# Patient Record
Sex: FEMALE | Race: WHITE | Employment: OTHER | URBAN - NONMETROPOLITAN AREA
[De-identification: names, ages, dates, MRNs, and addresses within clinical notes are randomized per-mention and may not be internally consistent; named-entity substitution may affect disease eponyms.]

---

## 2019-05-22 RX ORDER — BUPIVACAINE HYDROCHLORIDE 2.5 MG/ML
5 INJECTION, SOLUTION EPIDURAL; INFILTRATION; INTRACAUDAL ONCE
Status: CANCELLED | OUTPATIENT
Start: 2019-05-22 | End: 2019-05-22

## 2019-05-22 RX ORDER — METHYLPREDNISOLONE ACETATE 80 MG/ML
80 INJECTION, SUSPENSION INTRA-ARTICULAR; INTRALESIONAL; INTRAMUSCULAR; SOFT TISSUE ONCE
Status: CANCELLED | OUTPATIENT
Start: 2019-05-22 | End: 2019-05-22

## 2019-05-23 ENCOUNTER — HOSPITAL ENCOUNTER (OUTPATIENT)
Dept: INTERVENTIONAL RADIOLOGY/VASCULAR | Age: 69
Discharge: HOME | End: 2019-05-23
Payer: MEDICARE

## 2019-05-23 VITALS
DIASTOLIC BLOOD PRESSURE: 64 MMHG | SYSTOLIC BLOOD PRESSURE: 139 MMHG | HEART RATE: 77 BPM | TEMPERATURE: 98.4 F | RESPIRATION RATE: 16 BRPM

## 2019-05-23 PROCEDURE — 6360000002 HC RX W HCPCS

## 2019-05-23 PROCEDURE — 2500000003 HC RX 250 WO HCPCS

## 2019-05-23 PROCEDURE — 62323 NJX INTERLAMINAR LMBR/SAC: CPT

## 2019-05-23 PROCEDURE — 2709999900 HC NON-CHARGEABLE SUPPLY

## 2019-05-23 PROCEDURE — 6360000004 HC RX CONTRAST MEDICATION: Performed by: RADIOLOGY

## 2019-05-23 RX ORDER — BENZTROPINE MESYLATE 2 MG/1
2 TABLET ORAL 2 TIMES DAILY
COMMUNITY

## 2019-05-23 RX ORDER — BUPIVACAINE HYDROCHLORIDE 2.5 MG/ML
5 INJECTION, SOLUTION EPIDURAL; INFILTRATION; INTRACAUDAL ONCE
Status: COMPLETED | OUTPATIENT
Start: 2019-05-23 | End: 2019-05-23

## 2019-05-23 RX ORDER — RAMIPRIL 5 MG/1
5 CAPSULE ORAL DAILY
COMMUNITY

## 2019-05-23 RX ORDER — METHYLPREDNISOLONE ACETATE 80 MG/ML
80 INJECTION, SUSPENSION INTRA-ARTICULAR; INTRALESIONAL; INTRAMUSCULAR; SOFT TISSUE ONCE
Status: COMPLETED | OUTPATIENT
Start: 2019-05-23 | End: 2019-05-23

## 2019-05-23 RX ORDER — GABAPENTIN 300 MG/1
300 CAPSULE ORAL 3 TIMES DAILY
COMMUNITY

## 2019-05-23 RX ADMIN — IOHEXOL 1 ML: 180 INJECTION INTRAVENOUS at 11:53

## 2019-05-23 RX ADMIN — BUPIVACAINE HYDROCHLORIDE 2 ML: 2.5 INJECTION, SOLUTION EPIDURAL; INFILTRATION; INTRACAUDAL at 11:53

## 2019-05-23 RX ADMIN — METHYLPREDNISOLONE ACETATE 80 MG: 80 INJECTION, SUSPENSION INTRA-ARTICULAR; INTRALESIONAL; INTRAMUSCULAR; SOFT TISSUE at 11:53

## 2019-05-23 ASSESSMENT — PAIN SCALES - GENERAL
PAINLEVEL_OUTOF10: 8
PAINLEVEL_OUTOF10: 0
PAINLEVEL_OUTOF10: 0

## 2019-05-23 ASSESSMENT — PAIN DESCRIPTION - DESCRIPTORS: DESCRIPTORS: STABBING

## 2019-05-23 ASSESSMENT — PAIN - FUNCTIONAL ASSESSMENT: PAIN_FUNCTIONAL_ASSESSMENT: 0-10

## 2019-05-23 NOTE — PROGRESS NOTES
[facility] 327 INJECTION SITE SOFT AND DRY, DENIES PAIN OR NUMBNESS. LEG MOVEMENT GOOD. WATER TAKEN. 1230 INJECTION SITE SOFT AND DRY, DENIES PAIN OR NUMBNESS. UP WITH HELP. HOME INSTRUCTIONS TO PT AND FAMILY VERBALIZED UNDERSTANDING. [address] WITH FAMILY.

## 2019-05-23 NOTE — PROGRESS NOTES
[address] BLOCK PROCEDURE REVIEWED WITH PT VERBALIZED UNDERSTANDING. PEDAL PUSH AND PULL WEAKER ON RIGHT.

## 2019-05-23 NOTE — BRIEF OP NOTE
Department of Radiology  Post Procedure Progress Note    Pre-Procedure Diagnosis:  Lumbar radiculopathy     Procedure Performed:  Epidural block/steroid injection      Anesthesia: local     Findings: successful    Immediate Complications:  None    Estimated Blood Loss: minimal    SEE DICTATED PROCEDURE NOTE FOR COMPLETE DETAILS.       Electronically signed by Jerod Ulrich MD on 5/23/2019 at 12:00 PM

## 2019-05-23 NOTE — H&P
Formulation and discussion of sedation / procedure plans, risks, benefits, side effects and alternatives with patient and/or responsible adult completed.     Electronically signed by Jace Bamberger, MD on 5/23/2019 at 11:57 AM

## 2023-10-20 ENCOUNTER — HOSPITAL ENCOUNTER (OUTPATIENT)
Age: 73
Discharge: HOME | End: 2023-10-20
Payer: MEDICARE

## 2023-10-20 LAB
ANION GAP SERPL CALC-SCNC: 12 MEQ/L (ref 8–16)
BUN SERPL-MCNC: 17 MG/DL (ref 7–22)
CALCIUM SERPL-MCNC: 9.5 MG/DL (ref 8.5–10.5)
CHLORIDE SERPL-SCNC: 104 MEQ/L (ref 98–111)
CO2 SERPL-SCNC: 26 MEQ/L (ref 23–33)
CREAT SERPL-MCNC: 0.8 MG/DL (ref 0.4–1.2)
DEPRECATED RDW RBC AUTO: 44.9 FL (ref 35–45)
ERYTHROCYTE [DISTWIDTH] IN BLOOD BY AUTOMATED COUNT: 13.2 % (ref 11.5–14.5)
GFR SERPL CREATININE-BSD FRML MDRD: > 60 ML/MIN/1.73M2
GLUCOSE SERPL-MCNC: 101 MG/DL (ref 70–108)
HCT VFR BLD AUTO: 45.5 % (ref 37–47)
HGB BLD-MCNC: 14.3 GM/DL (ref 12–16)
MCH RBC QN AUTO: 29.1 PG (ref 26–33)
MCHC RBC AUTO-ENTMCNC: 31.4 GM/DL (ref 32.2–35.5)
MCV RBC AUTO: 92.7 FL (ref 81–99)
PLATELET # BLD AUTO: 216 THOU/MM3 (ref 130–400)
PMV BLD AUTO: 11.1 FL (ref 9.4–12.4)
POTASSIUM SERPL-SCNC: 4.1 MEQ/L (ref 3.5–5.2)
RBC # BLD AUTO: 4.91 MILL/MM3 (ref 4.2–5.4)
SODIUM SERPL-SCNC: 142 MEQ/L (ref 135–145)
WBC # BLD AUTO: 4.9 THOU/MM3 (ref 4.8–10.8)

## 2023-10-20 PROCEDURE — 36415 COLL VENOUS BLD VENIPUNCTURE: CPT

## 2023-10-20 PROCEDURE — 93005 ELECTROCARDIOGRAM TRACING: CPT | Performed by: SPECIALIST

## 2023-10-20 PROCEDURE — 85027 COMPLETE CBC AUTOMATED: CPT

## 2023-10-20 PROCEDURE — 80048 BASIC METABOLIC PNL TOTAL CA: CPT

## 2023-10-21 LAB
EKG ATRIAL RATE: 74 BPM
EKG P AXIS: 77 DEGREES
EKG P-R INTERVAL: 180 MS
EKG Q-T INTERVAL: 352 MS
EKG QRS DURATION: 76 MS
EKG QTC CALCULATION (BAZETT): 390 MS
EKG R AXIS: 61 DEGREES
EKG T AXIS: 61 DEGREES
EKG VENTRICULAR RATE: 74 BPM

## 2023-11-10 RX ORDER — RAMIPRIL 10 MG/1
10 CAPSULE ORAL DAILY
COMMUNITY

## 2023-11-10 NOTE — PROGRESS NOTES
Instructions given to patient, she was struggling to understand.   NPO after midnight  [employer] and drivers license  Wear comfortable clean clothing  Do not bring jewelry  Shower night before and morning of surgery with a liquid antibacterial soap  Bring list of medications with dosage and how often taken  Follow all instructions given by your physician   needed at discharge  Please limit to 2 visitors for surgery  You must have a responsible adult with you day of surgery and for 24 hours after surgery  Call [phone] for any questions

## 2023-11-10 NOTE — PROGRESS NOTES
Called patient's son Esperanza Edu and surgery instructions given to him  NPO after midnight  [employer] and drivers license  Wear comfortable clean clothing  Do not bring jewelry  Shower night before and morning of surgery with a liquid antibacterial soap  Bring list of medications with dosage and how often taken  Follow all instructions given by your physician   needed at discharge  Please limit to 2 visitors for surgery  You must have a responsible adult with you day of surgery and for 24 hours after surgery  Call [phone] for any questions

## 2023-11-17 ENCOUNTER — ANESTHESIA EVENT (OUTPATIENT)
Dept: OPERATING ROOM | Age: 73
End: 2023-11-17
Payer: MEDICARE

## 2023-11-17 ENCOUNTER — ANESTHESIA (OUTPATIENT)
Dept: OPERATING ROOM | Age: 73
End: 2023-11-17
Payer: MEDICARE

## 2023-11-17 ENCOUNTER — HOSPITAL ENCOUNTER (OUTPATIENT)
Age: 73
Setting detail: OUTPATIENT SURGERY
Discharge: HOME | End: 2023-11-17
Attending: SPECIALIST | Admitting: SPECIALIST
Payer: MEDICARE

## 2023-11-17 VITALS
SYSTOLIC BLOOD PRESSURE: 194 MMHG | BODY MASS INDEX: 30.33 KG/M2 | HEIGHT: 62 IN | TEMPERATURE: 98.3 F | OXYGEN SATURATION: 95 % | RESPIRATION RATE: 16 BRPM | HEART RATE: 81 BPM | WEIGHT: 164.8 LBS | DIASTOLIC BLOOD PRESSURE: 74 MMHG

## 2023-11-17 DIAGNOSIS — C44.311 BASAL CELL CARCINOMA OF NOSE: Primary | ICD-10-CM

## 2023-11-17 PROCEDURE — 2500000003 HC RX 250 WO HCPCS: Performed by: NURSE ANESTHETIST, CERTIFIED REGISTERED

## 2023-11-17 PROCEDURE — 6370000000 HC RX 637 (ALT 250 FOR IP): Performed by: SPECIALIST

## 2023-11-17 PROCEDURE — 2500000003 HC RX 250 WO HCPCS: Performed by: SPECIALIST

## 2023-11-17 PROCEDURE — 6360000002 HC RX W HCPCS: Performed by: SPECIALIST

## 2023-11-17 PROCEDURE — 3600000012 HC SURGERY LEVEL 2 ADDTL 15MIN: Performed by: SPECIALIST

## 2023-11-17 PROCEDURE — 3700000001 HC ADD 15 MINUTES (ANESTHESIA): Performed by: SPECIALIST

## 2023-11-17 PROCEDURE — 3700000000 HC ANESTHESIA ATTENDED CARE: Performed by: SPECIALIST

## 2023-11-17 PROCEDURE — 3600000002 HC SURGERY LEVEL 2 BASE: Performed by: SPECIALIST

## 2023-11-17 PROCEDURE — 2580000003 HC RX 258: Performed by: NURSE ANESTHETIST, CERTIFIED REGISTERED

## 2023-11-17 PROCEDURE — 7100000011 HC PHASE II RECOVERY - ADDTL 15 MIN: Performed by: SPECIALIST

## 2023-11-17 PROCEDURE — 2709999900 HC NON-CHARGEABLE SUPPLY: Performed by: SPECIALIST

## 2023-11-17 PROCEDURE — 7100000010 HC PHASE II RECOVERY - FIRST 15 MIN: Performed by: SPECIALIST

## 2023-11-17 PROCEDURE — 6360000002 HC RX W HCPCS: Performed by: NURSE ANESTHETIST, CERTIFIED REGISTERED

## 2023-11-17 RX ORDER — PROPOFOL 10 MG/ML
INJECTION, EMULSION INTRAVENOUS PRN
Status: DISCONTINUED | OUTPATIENT
Start: 2023-11-17 | End: 2023-11-17 | Stop reason: SDUPTHER

## 2023-11-17 RX ORDER — FENTANYL CITRATE 50 UG/ML
INJECTION, SOLUTION INTRAMUSCULAR; INTRAVENOUS PRN
Status: DISCONTINUED | OUTPATIENT
Start: 2023-11-17 | End: 2023-11-17 | Stop reason: SDUPTHER

## 2023-11-17 RX ORDER — LIDOCAINE HYDROCHLORIDE AND EPINEPHRINE 10; 10 MG/ML; UG/ML
INJECTION, SOLUTION INFILTRATION; PERINEURAL PRN
Status: DISCONTINUED | OUTPATIENT
Start: 2023-11-17 | End: 2023-11-17 | Stop reason: ALTCHOICE

## 2023-11-17 RX ORDER — ERYTHROMYCIN 5 MG/G
OINTMENT OPHTHALMIC PRN
Status: DISCONTINUED | OUTPATIENT
Start: 2023-11-17 | End: 2023-11-17 | Stop reason: ALTCHOICE

## 2023-11-17 RX ORDER — HYDROCODONE BITARTRATE AND ACETAMINOPHEN 5; 325 MG/1; MG/1
1 TABLET ORAL EVERY 6 HOURS PRN
Qty: 10 TABLET | Refills: 0 | Status: SHIPPED | OUTPATIENT
Start: 2023-11-17 | End: 2023-11-20

## 2023-11-17 RX ORDER — LIDOCAINE HYDROCHLORIDE 20 MG/ML
INJECTION, SOLUTION EPIDURAL; INFILTRATION; INTRACAUDAL; PERINEURAL PRN
Status: DISCONTINUED | OUTPATIENT
Start: 2023-11-17 | End: 2023-11-17 | Stop reason: SDUPTHER

## 2023-11-17 RX ORDER — SODIUM CHLORIDE 9 MG/ML
INJECTION, SOLUTION INTRAVENOUS CONTINUOUS PRN
Status: DISCONTINUED | OUTPATIENT
Start: 2023-11-17 | End: 2023-11-17 | Stop reason: SDUPTHER

## 2023-11-17 RX ADMIN — FENTANYL CITRATE 25 MCG: 50 INJECTION, SOLUTION INTRAMUSCULAR; INTRAVENOUS at 12:16

## 2023-11-17 RX ADMIN — FENTANYL CITRATE 50 MCG: 50 INJECTION, SOLUTION INTRAMUSCULAR; INTRAVENOUS at 12:00

## 2023-11-17 RX ADMIN — SODIUM CHLORIDE: 9 INJECTION, SOLUTION INTRAVENOUS at 11:55

## 2023-11-17 RX ADMIN — LIDOCAINE HYDROCHLORIDE 50 MG: 20 INJECTION, SOLUTION EPIDURAL; INFILTRATION; INTRACAUDAL; PERINEURAL at 12:00

## 2023-11-17 RX ADMIN — PROPOFOL 25 MG: 10 INJECTION, EMULSION INTRAVENOUS at 12:10

## 2023-11-17 RX ADMIN — PROPOFOL 50 MG: 10 INJECTION, EMULSION INTRAVENOUS at 12:00

## 2023-11-17 RX ADMIN — PROPOFOL 25 MG: 10 INJECTION, EMULSION INTRAVENOUS at 12:16

## 2023-11-17 RX ADMIN — FENTANYL CITRATE 25 MCG: 50 INJECTION, SOLUTION INTRAMUSCULAR; INTRAVENOUS at 13:00

## 2023-11-17 RX ADMIN — Medication 2000 MG: at 12:10

## 2023-11-17 ASSESSMENT — PAIN - FUNCTIONAL ASSESSMENT: PAIN_FUNCTIONAL_ASSESSMENT: NONE - DENIES PAIN

## 2023-11-17 ASSESSMENT — PAIN SCALES - GENERAL: PAINLEVEL_OUTOF10: 0

## 2023-11-17 NOTE — DISCHARGE INSTRUCTIONS
POST OPERATIVE INSTRUCTION SHEET  SKIN TUMOR/LESION REMOVAL    Activity:  No strenuous activity for 48 hours  No activity that stresses the suture closure/incision  Regular diet  ABSOLUTELY NO NICOTINE OF ANY TYPE    Wound Care:  Leave yellow dressing on nose in place. Do not remove yellow dressing. Do not get wet. Dermabond was used in front of your ear, do not scrub, rub or pick at adhesive glue  Recommend sleeping in a recliner or with head elevated for next 2-3 nights. Limitations:  No swimming, hot tub, sauna or soaking in a bathtub    Prescriptions: Take exactly as prescribed  A prescription for Riley Contreras has been sent to your pharmacy  You may resume Aleve on Sunday      Follow-Up:  Wednesday November 22nd at 8:30AM. Bring Erythromycin ointment with you to follow up. You will not need to use it until then. Notify our office if you experience any of the following:   Develop a fever (temperature is greater than 100.5F)   Develop redness greater than 1 cm around incision or red streaks up         extremity   Have any excess bleeding/ increased drainage or swelling at the             incision site        How Do you Prevent Surgical Site Infections? *HAND WASHING     *STOP SHAVING   Wash your hands multiple times daily  Do not shave incisional area 48 hours before   with soap for 20 seconds. or until 2 weeks after surgery. This can   Before eating and wound care. cause tiny cuts in the skin which can lead to infection. After using the bathroom or touching pets. *BALANCE      Times of activity and rest.      Stay away from people who may be sick. *QUIT SMOKING      Cigarette smoking leads to slow wound      healing. [facility] YOUR BODY      Follow your doctor's instructions on washing the night before and the day of your surgery. You may use products like:  Dial antibacterial soap, Hibiclens, Brittany-hex.          Do not share personal items such as towels, wash cloths, or toothbrush. *BLOOD SUGAR CONTROL                  Lower blood sugars help to prevent                          infections, scarring, and slow wound                          healing. Blood sugar goal less than 200. *EAT HEALTHY       Eat plenty of protein,        vegetables, and fruits. Limit sugars and processed foods. *BED LINENS      *DRESSING CHANGE        Make sure your bed linens are freshly   Follow your discharge instructions for wound        washed. NO pets in the bed. Your animals care and bathing:       carry bacteria in their fur, skin or feathers  ~Keep your dressing clean and dry       which can enter site, causing infection. ~Clean and washed clothes are important                                                            ~Call the doctor if you develop a fever,                                                       your incision has redness, an odor or                                                             yellowish/greenish drainage.

## 2023-11-17 NOTE — ANESTHESIA POSTPROCEDURE EVALUATION
Department of Anesthesiology  Postprocedure Note    Patient: Mayra Stevens  MRN: [id number]  YOB: 1950  Date of evaluation: 11/17/2023      Procedure Summary     Date: 11/17/23 Room / Location: [address] [place] [address] 04 / [facility]    Anesthesia Start: 9440 Anesthesia Stop: 0090    Procedure: MOHS Defect BCC Bridge of Nose with FTSG from left preauricular (Face) Diagnosis:       Basal cell carcinoma of nose      (Basal cell carcinoma of nose [C44.311])    Surgeons: Azul Delarosa MD Responsible Provider: Ludmila Matt MD    Anesthesia Type: MAC ASA Status: 3          Anesthesia Type: MAC    Tony Phase I:      Tony Phase II: Tony Score: 10      Anesthesia Post Evaluation    Complications: no

## 2023-11-17 NOTE — PROGRESS NOTES
1308 Patient to phase 2 from surgery. Report Eliana Garsia RN. Patient alert, appropriately answers questions. Patient's arms shaking, son and  at bedside states this is normal from [address] [place] [address]. Vitals stable, BP noted high-patients arm is shaking and took multiple attempts. Patient's  states she takes BP meds and home and they can check her BP at home. Bolster and steri strips on nose clean, dry, intact. Skin graft site in front of right ear with no drainage noted. 1314 Call light in reach. Snack and drink given per preference. 1330 IV taken out and dressing applied with no complications. Patient assisted in getting dressed at this time. 5 AVS discussed with patient, , and son. All questions answered at this time. Erithromycin ointment given to . 1355 Patient taken to discharge doors in stable condition. Discharged with AVS and all belongings. Bolster and incision along left ear clean, dry, intact.

## 2023-11-17 NOTE — OP NOTE
Operative Note    Patient name: Ike Mendes  Record Number: [id number]    Primary Care Physician: Isha Olivo MD     1950    Date of Procedure: 2023    Pre-operative Diagnosis: 5cm2 defect of bridge of nose s/p MOHS for basal cell carcinoma    Post-operative Diagnosis: Same    Procedure Performed: Full thickness skin graft (5 cm2) repair of bridge of nose (CPT 34286)    Surgeons/Assistants: MD Michael Ratliff PA-C    Estimated Blood Loss: 5ml     Complications: none immediately appreciated    Procedure: With the patient lying in the supine position and under adequate anesthesia per the anesthesia team.   Local anesthesia consisting of 11 ml of 1% Lidocaine 1:100,000 with epinephrine solution of the donor site of the left preauricular sulcus as well as the nasal bridge defect. The areas were prepped and draped in the standard surgical fashion. The patient has very thin skin and a large (5cm2)  defect which could not be closed primarily due to tension and distortion of eyelids, therefore a full thickness skin graft was taken. It was defatted and secured in position with a 3-0 silk suture tie and then a 5-0 fast absorbable suture was placed in a simple running fashion. A Bacitracin Xeroform and moist cotton ball tie over bolster was secured using the tails of the silk sutures. The donor site was closed with a 4-0 Monocryl suture placed in interrupted buried fashion and then Histoacryl respectively. The patient tolerated the procedure well and remained hemodynamically stable throughout the procedure and was quite comfortable throughout the operative course. Clinical staging for cancer cases:  Ct  Cn  Jitendra De La Torre MD  Electronically signed by me on 2023 at 1:18 PM Operative Note      Patient: Jose Roberto Burden  YOB: 1950  MRN: [id number]    Date of Procedure: 2023    Pre-Op Diagnosis Codes:      * Basal cell carcinoma of nose [C44.311]    Post-Op Diagnosis: Same       Procedure(s):  MOHS Defect BCC Bridge of Nose with FTSG from left preauricular    Surgeon(s):  Cachorro Whatley MD    Assistant:   Physician Assistant: Dawit Martinez PA-C    Anesthesia: Monitor Anesthesia Care    Estimated Blood Loss (mL): less than 50     Complications: None    Specimens:   * No specimens in log *    Implants:  * No implants in log *      Drains: * No LDAs found *    Findings: 5cm2 defect of bridge of nose s/p MOHS for basal cell carcinoma          Detailed Description of Procedure:   Full thickness skin graft (5 cm2) repair of bridge of nose (CPT 28578)      Electronically signed by Cachorro Whatley MD on 11/17/2023 at 1:17 PM

## 2023-11-17 NOTE — ANESTHESIA PRE PROCEDURE
Endo/Other:                     Abdominal:             Vascular:           Other Findings:           Anesthesia Plan      MAC     ASA 3                                   Marilu Spicer MD   11/17/2023

## 2023-11-17 NOTE — H&P
[address]  History and Physical Update    Pt Name: Yamil Baptiste  MRN: [id number]  YOB: 1950  Date of evaluation: 11/17/2023    I have examined the patient and reviewed the H&P/Consult and there are no changes to the patient or plans.       Radha Medeiros MD  Electronically signed 11/17/2023 at 11:20 AM

## 2023-12-12 ENCOUNTER — NURSE ONLY (OUTPATIENT)
Dept: LAB | Age: 73
End: 2023-12-12

## 2023-12-12 DIAGNOSIS — R49.8 HYPOPHONIA: ICD-10-CM

## 2023-12-12 DIAGNOSIS — R26.89 BALANCE PROBLEM: ICD-10-CM

## 2023-12-12 DIAGNOSIS — G20.A2 PARKINSON'S DISEASE WITH FLUCTUATING MANIFESTATIONS, UNSPECIFIED WHETHER DYSKINESIA PRESENT: ICD-10-CM

## 2023-12-12 DIAGNOSIS — M89.9 DISORDER OF BONE, UNSPECIFIED: ICD-10-CM

## 2023-12-12 DIAGNOSIS — R29.6 FREQUENT FALLS: ICD-10-CM

## 2023-12-12 DIAGNOSIS — R25.8 BRADYKINESIA: ICD-10-CM

## 2023-12-12 LAB
25(OH)D3 SERPL-MCNC: 69 NG/ML (ref 30–100)
FOLATE SERPL-MCNC: 10 NG/ML (ref 4.8–24.2)
VIT B12 SERPL-MCNC: 776 PG/ML (ref 211–911)

## 2023-12-15 LAB — PYRIDOXAL PHOS SERPL-SCNC: 11.5 NMOL/L (ref 20–125)

## 2024-01-25 ENCOUNTER — OFFICE VISIT (OUTPATIENT)
Dept: NEUROLOGY | Age: 74
End: 2024-01-25
Payer: MEDICARE

## 2024-01-25 VITALS
DIASTOLIC BLOOD PRESSURE: 78 MMHG | OXYGEN SATURATION: 98 % | SYSTOLIC BLOOD PRESSURE: 128 MMHG | HEART RATE: 78 BPM | BODY MASS INDEX: 30.36 KG/M2 | WEIGHT: 165 LBS | HEIGHT: 62 IN

## 2024-01-25 DIAGNOSIS — G20.A2 PARKINSON'S DISEASE WITH FLUCTUATING MANIFESTATIONS, UNSPECIFIED WHETHER DYSKINESIA PRESENT: Primary | ICD-10-CM

## 2024-01-25 DIAGNOSIS — R49.8 HYPOPHONIA: ICD-10-CM

## 2024-01-25 DIAGNOSIS — R25.8 BRADYKINESIA: ICD-10-CM

## 2024-01-25 DIAGNOSIS — R26.89 BALANCE PROBLEM: ICD-10-CM

## 2024-01-25 DIAGNOSIS — R29.6 FREQUENT FALLS: ICD-10-CM

## 2024-01-25 PROCEDURE — 1036F TOBACCO NON-USER: CPT | Performed by: NURSE PRACTITIONER

## 2024-01-25 PROCEDURE — G8427 DOCREV CUR MEDS BY ELIG CLIN: HCPCS | Performed by: NURSE PRACTITIONER

## 2024-01-25 PROCEDURE — G8484 FLU IMMUNIZE NO ADMIN: HCPCS | Performed by: NURSE PRACTITIONER

## 2024-01-25 PROCEDURE — G8400 PT W/DXA NO RESULTS DOC: HCPCS | Performed by: NURSE PRACTITIONER

## 2024-01-25 PROCEDURE — 1090F PRES/ABSN URINE INCON ASSESS: CPT | Performed by: NURSE PRACTITIONER

## 2024-01-25 PROCEDURE — 1123F ACP DISCUSS/DSCN MKR DOCD: CPT | Performed by: NURSE PRACTITIONER

## 2024-01-25 PROCEDURE — 3017F COLORECTAL CA SCREEN DOC REV: CPT | Performed by: NURSE PRACTITIONER

## 2024-01-25 PROCEDURE — G8417 CALC BMI ABV UP PARAM F/U: HCPCS | Performed by: NURSE PRACTITIONER

## 2024-01-25 PROCEDURE — 99214 OFFICE O/P EST MOD 30 MIN: CPT | Performed by: NURSE PRACTITIONER

## 2024-01-25 RX ORDER — AMANTADINE HYDROCHLORIDE 100 MG/1
100 CAPSULE, GELATIN COATED ORAL 2 TIMES DAILY
Qty: 60 CAPSULE | Refills: 4 | Status: SHIPPED | OUTPATIENT
Start: 2024-01-25

## 2024-01-25 NOTE — PATIENT INSTRUCTIONS
Change Sinemet 25/100 1.5 tablets three times a day,  take every 5 hours while awake, take at 7am, 12pm, and 5 pm daily   Change Amantadine to 100 mg two times daily at 8am and 3pm  Continue with Cogentin at current dose  Follow through with  physical therapy recommendations: parkinson's disease, gait safety  You need to stay physically active  Call with any new symptoms or concerns.   Follow up in 6 weeks.

## 2024-01-25 NOTE — PROGRESS NOTES
NEUROLOGY OUT PATIENT FOLLOW UP NOTE:  1/25/20249:01 AM    Angi Mejia is here for follow up for parkinson's disease, hypophonia, bradykinesia.            No Known Allergies    Current Outpatient Medications:     clotrimazole (LOTRIMIN) 1 % cream, Apply topically 2 times daily Apply topically 2 times daily., Disp: , Rfl:     donepezil (ARICEPT) 5 MG tablet, Take 1 tablet by mouth nightly, Disp: , Rfl:     carbidopa-levodopa (SINEMET)  MG per tablet, Take 1 tablet by mouth 3 times daily Take every 5 hours while awake. Take at 7am, 12pm, and 5pm daily, Disp: 90 tablet, Rfl: 3    amantadine (SYMMETREL) 100 MG capsule, Take 1 capsule by mouth daily Take at 8am, Disp: 30 capsule, Rfl: 3    ramipril (ALTACE) 10 MG capsule, Take 1 capsule by mouth daily, Disp: , Rfl:     Naproxen Sodium (ALEVE PO), Take by mouth daily as needed, Disp: , Rfl:     Cyanocobalamin (VITAMIN B-12 PO), Take by mouth daily, Disp: , Rfl:     Cholecalciferol (D3 VITAMIN PO), Take by mouth daily, Disp: , Rfl:     benztropine (COGENTIN) 2 MG tablet, Take 1 tablet by mouth daily, Disp: , Rfl:     I reviewed the past medical history, allergies, medications, social history and family history.       PE:   Vitals:    01/25/24 0856   BP: 128/78   Site: Left Upper Arm   Position: Sitting   Cuff Size: Medium Adult   Pulse: 78   SpO2: 98%   Weight: 74.8 kg (165 lb)   Height: 1.575 m (5' 2\")     General Appearance:  alert and cooperative  Gen: NAD, Language is Intact. Skin: no rash, lesion, dry  to touch. warm  Head: no rash, no icterus  Neck: There is no carotid bruits. The Neck is supple.    Neuro: CN 2-12 grossly intact with no focal deficits. Power 5/5 Throughout symmetric, Reflexes are  symmetric. Long tracts are intact. Cerebellar exam is Intact. Sensory exam is intact to light touch.  Gait and station shuffling, guarded +ve right hand resting tremor, +ve pinrolling +ve bradykinesia, +ve Hypohonia, +ve decreased blink rate, +ve difficulty

## 2024-03-08 ENCOUNTER — OFFICE VISIT (OUTPATIENT)
Dept: NEUROLOGY | Age: 74
End: 2024-03-08

## 2024-03-08 VITALS
HEIGHT: 62 IN | DIASTOLIC BLOOD PRESSURE: 78 MMHG | HEART RATE: 69 BPM | WEIGHT: 160 LBS | OXYGEN SATURATION: 98 % | SYSTOLIC BLOOD PRESSURE: 130 MMHG | BODY MASS INDEX: 29.44 KG/M2

## 2024-03-08 DIAGNOSIS — R25.8 BRADYKINESIA: ICD-10-CM

## 2024-03-08 DIAGNOSIS — R26.89 BALANCE PROBLEM: ICD-10-CM

## 2024-03-08 DIAGNOSIS — G20.A2 PARKINSON'S DISEASE WITHOUT DYSKINESIA, WITH FLUCTUATING MANIFESTATIONS: Primary | ICD-10-CM

## 2024-03-08 DIAGNOSIS — R49.8 HYPOPHONIA: ICD-10-CM

## 2024-03-08 NOTE — PATIENT INSTRUCTIONS
Take Sinemet 25/100 1.5 tablets three times a day,  take every 5 hours while awake, take at 7am, 12pm, and 5 pm daily   Amantadine 100 mg two times daily at 8am and 3pm  Continue with Cogentin at current dose  Continue with Vitamin B6 supplements, 50 mg daily over the counter  Follow through with  physical therapy recommendations: parkinson's disease, gait safety  Use your cane at all times  You need to stay physically active  Call with any new symptoms or concerns.   Follow up in 3 months.

## 2024-03-08 NOTE — PROGRESS NOTES
NEUROLOGY OUT PATIENT FOLLOW UP NOTE:  3/8/60189:42 AM    Angi Mejia is here for follow up for parkinson's disease, hypophonia, bradykinesia.            No Known Allergies    Current Outpatient Medications:     carbidopa-levodopa (SINEMET)  MG per tablet, Take 1.5 tablets by mouth 3 times daily, Disp: 135 tablet, Rfl: 3    amantadine (SYMMETREL) 100 MG capsule, Take 1 capsule by mouth 2 times daily, Disp: 60 capsule, Rfl: 4    clotrimazole (LOTRIMIN) 1 % cream, Apply topically 2 times daily Apply topically 2 times daily., Disp: , Rfl:     donepezil (ARICEPT) 5 MG tablet, Take 1 tablet by mouth nightly, Disp: , Rfl:     ramipril (ALTACE) 10 MG capsule, Take 1 capsule by mouth daily, Disp: , Rfl:     Naproxen Sodium (ALEVE PO), Take by mouth daily as needed, Disp: , Rfl:     Cyanocobalamin (VITAMIN B-12 PO), Take by mouth daily, Disp: , Rfl:     Cholecalciferol (D3 VITAMIN PO), Take by mouth daily, Disp: , Rfl:     benztropine (COGENTIN) 2 MG tablet, Take 1 tablet by mouth daily, Disp: , Rfl:     I reviewed the past medical history, allergies, medications, social history and family history.       PE:   Vitals:    03/08/24 0835   BP: 130/78   Site: Left Upper Arm   Position: Sitting   Cuff Size: Medium Adult   Pulse: 69   SpO2: 98%   Weight: 72.6 kg (160 lb)   Height: 1.575 m (5' 2\")     General Appearance:  alert and cooperative  Gen: NAD, Language is Intact. Skin: no rash, lesion, dry  to touch. warm  Head: no rash, no icterus  Neck: There is no carotid bruits. The Neck is supple.    Neuro: CN 2-12 grossly intact with no focal deficits. Power 5/5 Throughout symmetric, Reflexes are  symmetric. Long tracts are intact. Cerebellar exam is Intact. Sensory exam is intact to light touch.  Gait and station shuffling, guarded +ve right hand resting tremor, +ve pinrolling +ve bradykinesia, +ve Hypohonia, +ve decreased blink rate, +ve difficulty exiting chair, +ve right arm decreased arm swing, +vestooped

## 2024-04-22 ENCOUNTER — APPOINTMENT (OUTPATIENT)
Dept: MRI IMAGING | Age: 74
DRG: 689 | End: 2024-04-22
Payer: MEDICARE

## 2024-04-22 ENCOUNTER — APPOINTMENT (OUTPATIENT)
Dept: CT IMAGING | Age: 74
DRG: 689 | End: 2024-04-22
Payer: MEDICARE

## 2024-04-22 ENCOUNTER — APPOINTMENT (OUTPATIENT)
Dept: GENERAL RADIOLOGY | Age: 74
DRG: 689 | End: 2024-04-22
Payer: MEDICARE

## 2024-04-22 ENCOUNTER — HOSPITAL ENCOUNTER (INPATIENT)
Age: 74
LOS: 3 days | Discharge: NURSING, REHAB OR LONG-TERM CARE | DRG: 689 | End: 2024-04-25
Attending: EMERGENCY MEDICINE | Admitting: STUDENT IN AN ORGANIZED HEALTH CARE EDUCATION/TRAINING PROGRAM
Payer: MEDICARE

## 2024-04-22 ENCOUNTER — APPOINTMENT (OUTPATIENT)
Dept: ULTRASOUND IMAGING | Age: 74
DRG: 689 | End: 2024-04-22
Payer: MEDICARE

## 2024-04-22 DIAGNOSIS — R29.90 STROKE-LIKE SYMPTOM: Primary | ICD-10-CM

## 2024-04-22 DIAGNOSIS — Z51.5 PALLIATIVE CARE PATIENT: ICD-10-CM

## 2024-04-22 DIAGNOSIS — G20.A1 PARKINSON'S DISEASE, UNSPECIFIED WHETHER DYSKINESIA PRESENT, UNSPECIFIED WHETHER MANIFESTATIONS FLUCTUATE (HCC): ICD-10-CM

## 2024-04-22 PROBLEM — G92.8 TOXIC METABOLIC ENCEPHALOPATHY: Status: ACTIVE | Noted: 2024-04-22

## 2024-04-22 LAB
ALBUMIN SERPL BCG-MCNC: 3.8 G/DL (ref 3.5–5.1)
ALP SERPL-CCNC: 241 U/L (ref 38–126)
ALT SERPL W/O P-5'-P-CCNC: < 5 U/L (ref 11–66)
ANION GAP SERPL CALC-SCNC: 12 MEQ/L (ref 8–16)
AST SERPL-CCNC: 15 U/L (ref 5–40)
BACTERIA URNS QL MICRO: ABNORMAL /HPF
BASOPHILS ABSOLUTE: 0.1 THOU/MM3 (ref 0–0.1)
BASOPHILS NFR BLD AUTO: 0.7 %
BILIRUB CONJ SERPL-MCNC: < 0.2 MG/DL (ref 0–0.3)
BILIRUB SERPL-MCNC: 0.5 MG/DL (ref 0.3–1.2)
BILIRUB UR QL STRIP.AUTO: NEGATIVE
BUN SERPL-MCNC: 14 MG/DL (ref 7–22)
CALCIUM SERPL-MCNC: 9.3 MG/DL (ref 8.5–10.5)
CASTS #/AREA URNS LPF: ABNORMAL /LPF
CASTS 2: ABNORMAL /LPF
CHARACTER UR: ABNORMAL
CHLORIDE SERPL-SCNC: 100 MEQ/L (ref 98–111)
CO2 SERPL-SCNC: 26 MEQ/L (ref 23–33)
COLOR: YELLOW
CREAT SERPL-MCNC: 0.6 MG/DL (ref 0.4–1.2)
CRYSTALS URNS MICRO: ABNORMAL
DEPRECATED MEAN GLUCOSE BLD GHB EST-ACNC: 102 MG/DL (ref 70–126)
DEPRECATED RDW RBC AUTO: 41.3 FL (ref 35–45)
EOSINOPHIL NFR BLD AUTO: 0.5 %
EOSINOPHILS ABSOLUTE: 0 THOU/MM3 (ref 0–0.4)
EPITHELIAL CELLS, UA: ABNORMAL /HPF
ERYTHROCYTE [DISTWIDTH] IN BLOOD BY AUTOMATED COUNT: 12.6 % (ref 11.5–14.5)
FLUAV RNA RESP QL NAA+PROBE: NOT DETECTED
FLUBV RNA RESP QL NAA+PROBE: NOT DETECTED
GFR SERPL CREATININE-BSD FRML MDRD: > 90 ML/MIN/1.73M2
GLUCOSE SERPL-MCNC: 132 MG/DL (ref 70–108)
GLUCOSE UR QL STRIP.AUTO: NEGATIVE MG/DL
HBA1C MFR BLD HPLC: 5.4 % (ref 4.4–6.4)
HCT VFR BLD AUTO: 46.6 % (ref 37–47)
HGB BLD-MCNC: 15 GM/DL (ref 12–16)
HGB UR QL STRIP.AUTO: NEGATIVE
IMM GRANULOCYTES # BLD AUTO: 0.07 THOU/MM3 (ref 0–0.07)
IMM GRANULOCYTES NFR BLD AUTO: 0.9 %
INR PPP: 1.07 (ref 0.85–1.13)
KETONES UR QL STRIP.AUTO: NEGATIVE
LYMPHOCYTES ABSOLUTE: 0.9 THOU/MM3 (ref 1–4.8)
LYMPHOCYTES NFR BLD AUTO: 12.6 %
MCH RBC QN AUTO: 29 PG (ref 26–33)
MCHC RBC AUTO-ENTMCNC: 32.2 GM/DL (ref 32.2–35.5)
MCV RBC AUTO: 90 FL (ref 81–99)
MISCELLANEOUS 2: ABNORMAL
MONOCYTES ABSOLUTE: 0.5 THOU/MM3 (ref 0.4–1.3)
MONOCYTES NFR BLD AUTO: 6.6 %
NEUTROPHILS NFR BLD AUTO: 78.7 %
NITRITE UR QL STRIP: NEGATIVE
NRBC BLD AUTO-RTO: 0 /100 WBC
OSMOLALITY SERPL CALC.SUM OF ELEC: 278 MOSMOL/KG (ref 275–300)
PH UR STRIP.AUTO: 7.5 [PH] (ref 5–9)
PLATELET # BLD AUTO: 247 THOU/MM3 (ref 130–400)
PMV BLD AUTO: 11 FL (ref 9.4–12.4)
POC CREATININE WHOLE BLOOD: 0.4 MG/DL (ref 0.5–1.2)
POTASSIUM SERPL-SCNC: 4 MEQ/L (ref 3.5–5.2)
PROT SERPL-MCNC: 7 G/DL (ref 6.1–8)
PROT UR STRIP.AUTO-MCNC: NEGATIVE MG/DL
RBC # BLD AUTO: 5.18 MILL/MM3 (ref 4.2–5.4)
RBC URINE: ABNORMAL /HPF
RENAL EPI CELLS #/AREA URNS HPF: ABNORMAL /[HPF]
SARS-COV-2 RNA RESP QL NAA+PROBE: NOT DETECTED
SEGMENTED NEUTROPHILS ABSOLUTE COUNT: 5.9 THOU/MM3 (ref 1.8–7.7)
SODIUM SERPL-SCNC: 138 MEQ/L (ref 135–145)
SP GR UR REFRACT.AUTO: > 1.03 (ref 1–1.03)
TROPONIN, HIGH SENSITIVITY: 11 NG/L (ref 0–12)
UROBILINOGEN, URINE: 1 EU/DL (ref 0–1)
WBC # BLD AUTO: 7.5 THOU/MM3 (ref 4.8–10.8)
WBC #/AREA URNS HPF: ABNORMAL /HPF
WBC #/AREA URNS HPF: NEGATIVE /[HPF]
YEAST LIKE FUNGI URNS QL MICRO: ABNORMAL

## 2024-04-22 PROCEDURE — 82565 ASSAY OF CREATININE: CPT

## 2024-04-22 PROCEDURE — 85610 PROTHROMBIN TIME: CPT

## 2024-04-22 PROCEDURE — 70450 CT HEAD/BRAIN W/O DYE: CPT

## 2024-04-22 PROCEDURE — 36415 COLL VENOUS BLD VENIPUNCTURE: CPT

## 2024-04-22 PROCEDURE — 2060000000 HC ICU INTERMEDIATE R&B

## 2024-04-22 PROCEDURE — 99291 CRITICAL CARE FIRST HOUR: CPT

## 2024-04-22 PROCEDURE — 99223 1ST HOSP IP/OBS HIGH 75: CPT | Performed by: STUDENT IN AN ORGANIZED HEALTH CARE EDUCATION/TRAINING PROGRAM

## 2024-04-22 PROCEDURE — 82248 BILIRUBIN DIRECT: CPT

## 2024-04-22 PROCEDURE — 6360000004 HC RX CONTRAST MEDICATION

## 2024-04-22 PROCEDURE — 6360000002 HC RX W HCPCS

## 2024-04-22 PROCEDURE — 87186 SC STD MICRODIL/AGAR DIL: CPT

## 2024-04-22 PROCEDURE — 76705 ECHO EXAM OF ABDOMEN: CPT

## 2024-04-22 PROCEDURE — 6370000000 HC RX 637 (ALT 250 FOR IP)

## 2024-04-22 PROCEDURE — 93010 ELECTROCARDIOGRAM REPORT: CPT | Performed by: NUCLEAR MEDICINE

## 2024-04-22 PROCEDURE — 6360000002 HC RX W HCPCS: Performed by: STUDENT IN AN ORGANIZED HEALTH CARE EDUCATION/TRAINING PROGRAM

## 2024-04-22 PROCEDURE — 93005 ELECTROCARDIOGRAM TRACING: CPT | Performed by: EMERGENCY MEDICINE

## 2024-04-22 PROCEDURE — 70498 CT ANGIOGRAPHY NECK: CPT

## 2024-04-22 PROCEDURE — 2580000003 HC RX 258: Performed by: STUDENT IN AN ORGANIZED HEALTH CARE EDUCATION/TRAINING PROGRAM

## 2024-04-22 PROCEDURE — 6370000000 HC RX 637 (ALT 250 FOR IP): Performed by: STUDENT IN AN ORGANIZED HEALTH CARE EDUCATION/TRAINING PROGRAM

## 2024-04-22 PROCEDURE — 70496 CT ANGIOGRAPHY HEAD: CPT

## 2024-04-22 PROCEDURE — 83036 HEMOGLOBIN GLYCOSYLATED A1C: CPT

## 2024-04-22 PROCEDURE — 85025 COMPLETE CBC W/AUTO DIFF WBC: CPT

## 2024-04-22 PROCEDURE — 84484 ASSAY OF TROPONIN QUANT: CPT

## 2024-04-22 PROCEDURE — 70551 MRI BRAIN STEM W/O DYE: CPT

## 2024-04-22 PROCEDURE — 80053 COMPREHEN METABOLIC PANEL: CPT

## 2024-04-22 PROCEDURE — 99285 EMERGENCY DEPT VISIT HI MDM: CPT

## 2024-04-22 PROCEDURE — 87086 URINE CULTURE/COLONY COUNT: CPT

## 2024-04-22 PROCEDURE — 71045 X-RAY EXAM CHEST 1 VIEW: CPT

## 2024-04-22 PROCEDURE — 87077 CULTURE AEROBIC IDENTIFY: CPT

## 2024-04-22 PROCEDURE — 96374 THER/PROPH/DIAG INJ IV PUSH: CPT

## 2024-04-22 PROCEDURE — 87636 SARSCOV2 & INF A&B AMP PRB: CPT

## 2024-04-22 PROCEDURE — 81001 URINALYSIS AUTO W/SCOPE: CPT

## 2024-04-22 RX ORDER — POTASSIUM CHLORIDE 7.45 MG/ML
10 INJECTION INTRAVENOUS PRN
Status: DISCONTINUED | OUTPATIENT
Start: 2024-04-22 | End: 2024-04-25 | Stop reason: HOSPADM

## 2024-04-22 RX ORDER — MAGNESIUM SULFATE IN WATER 40 MG/ML
2000 INJECTION, SOLUTION INTRAVENOUS PRN
Status: DISCONTINUED | OUTPATIENT
Start: 2024-04-22 | End: 2024-04-25 | Stop reason: HOSPADM

## 2024-04-22 RX ORDER — ATORVASTATIN CALCIUM 80 MG/1
80 TABLET, FILM COATED ORAL NIGHTLY
Status: DISCONTINUED | OUTPATIENT
Start: 2024-04-22 | End: 2024-04-25 | Stop reason: HOSPADM

## 2024-04-22 RX ORDER — SODIUM CHLORIDE 0.9 % (FLUSH) 0.9 %
5-40 SYRINGE (ML) INJECTION PRN
Status: DISCONTINUED | OUTPATIENT
Start: 2024-04-22 | End: 2024-04-25 | Stop reason: HOSPADM

## 2024-04-22 RX ORDER — DONEPEZIL HYDROCHLORIDE 10 MG/1
5 TABLET, FILM COATED ORAL NIGHTLY
Status: DISCONTINUED | OUTPATIENT
Start: 2024-04-22 | End: 2024-04-25 | Stop reason: HOSPADM

## 2024-04-22 RX ORDER — ASPIRIN 325 MG
325 TABLET ORAL DAILY
Status: DISCONTINUED | OUTPATIENT
Start: 2024-04-22 | End: 2024-04-23

## 2024-04-22 RX ORDER — ROPINIROLE 0.5 MG/1
0.5 TABLET, FILM COATED ORAL 2 TIMES DAILY
COMMUNITY

## 2024-04-22 RX ORDER — ONDANSETRON 2 MG/ML
INJECTION INTRAMUSCULAR; INTRAVENOUS
Status: COMPLETED
Start: 2024-04-22 | End: 2024-04-22

## 2024-04-22 RX ORDER — POTASSIUM CHLORIDE 20 MEQ/1
40 TABLET, EXTENDED RELEASE ORAL PRN
Status: DISCONTINUED | OUTPATIENT
Start: 2024-04-22 | End: 2024-04-25 | Stop reason: HOSPADM

## 2024-04-22 RX ORDER — ONDANSETRON 2 MG/ML
4 INJECTION INTRAMUSCULAR; INTRAVENOUS EVERY 6 HOURS PRN
Status: DISCONTINUED | OUTPATIENT
Start: 2024-04-22 | End: 2024-04-25 | Stop reason: HOSPADM

## 2024-04-22 RX ORDER — ONDANSETRON 4 MG/1
4 TABLET, ORALLY DISINTEGRATING ORAL EVERY 8 HOURS PRN
Status: DISCONTINUED | OUTPATIENT
Start: 2024-04-22 | End: 2024-04-25 | Stop reason: HOSPADM

## 2024-04-22 RX ORDER — AMANTADINE HYDROCHLORIDE 100 MG/1
100 CAPSULE, GELATIN COATED ORAL 2 TIMES DAILY
Status: DISCONTINUED | OUTPATIENT
Start: 2024-04-22 | End: 2024-04-25 | Stop reason: HOSPADM

## 2024-04-22 RX ORDER — ENOXAPARIN SODIUM 100 MG/ML
40 INJECTION SUBCUTANEOUS EVERY 24 HOURS
Status: DISCONTINUED | OUTPATIENT
Start: 2024-04-22 | End: 2024-04-25 | Stop reason: HOSPADM

## 2024-04-22 RX ORDER — PANTOPRAZOLE SODIUM 40 MG/1
40 TABLET, DELAYED RELEASE ORAL DAILY
COMMUNITY

## 2024-04-22 RX ORDER — FOLIC ACID 1 MG/1
1 TABLET ORAL DAILY
COMMUNITY

## 2024-04-22 RX ORDER — ACETAMINOPHEN 650 MG/1
650 SUPPOSITORY RECTAL EVERY 6 HOURS PRN
Status: DISCONTINUED | OUTPATIENT
Start: 2024-04-22 | End: 2024-04-25 | Stop reason: HOSPADM

## 2024-04-22 RX ORDER — CLOTRIMAZOLE 1 %
CREAM (GRAM) TOPICAL 2 TIMES DAILY
Status: DISCONTINUED | OUTPATIENT
Start: 2024-04-22 | End: 2024-04-22

## 2024-04-22 RX ORDER — POLYETHYLENE GLYCOL 3350 17 G/17G
17 POWDER, FOR SOLUTION ORAL DAILY PRN
Status: DISCONTINUED | OUTPATIENT
Start: 2024-04-22 | End: 2024-04-25 | Stop reason: HOSPADM

## 2024-04-22 RX ORDER — BENZTROPINE MESYLATE 1 MG/1
2 TABLET ORAL DAILY
Status: DISCONTINUED | OUTPATIENT
Start: 2024-04-22 | End: 2024-04-22

## 2024-04-22 RX ORDER — ACETAMINOPHEN 325 MG/1
650 TABLET ORAL EVERY 6 HOURS PRN
Status: DISCONTINUED | OUTPATIENT
Start: 2024-04-22 | End: 2024-04-25 | Stop reason: HOSPADM

## 2024-04-22 RX ORDER — SODIUM CHLORIDE 0.9 % (FLUSH) 0.9 %
5-40 SYRINGE (ML) INJECTION EVERY 12 HOURS SCHEDULED
Status: DISCONTINUED | OUTPATIENT
Start: 2024-04-22 | End: 2024-04-25 | Stop reason: HOSPADM

## 2024-04-22 RX ORDER — LISINOPRIL 40 MG/1
40 TABLET ORAL DAILY
Status: DISCONTINUED | OUTPATIENT
Start: 2024-04-22 | End: 2024-04-25 | Stop reason: HOSPADM

## 2024-04-22 RX ORDER — SODIUM CHLORIDE 9 MG/ML
INJECTION, SOLUTION INTRAVENOUS PRN
Status: DISCONTINUED | OUTPATIENT
Start: 2024-04-22 | End: 2024-04-25 | Stop reason: HOSPADM

## 2024-04-22 RX ORDER — 0.9 % SODIUM CHLORIDE 0.9 %
500 INTRAVENOUS SOLUTION INTRAVENOUS ONCE
Status: COMPLETED | OUTPATIENT
Start: 2024-04-22 | End: 2024-04-22

## 2024-04-22 RX ORDER — ONDANSETRON 2 MG/ML
4 INJECTION INTRAMUSCULAR; INTRAVENOUS ONCE
Status: COMPLETED | OUTPATIENT
Start: 2024-04-22 | End: 2024-04-22

## 2024-04-22 RX ORDER — ASPIRIN 81 MG/1
TABLET, CHEWABLE ORAL
Status: COMPLETED
Start: 2024-04-22 | End: 2024-04-22

## 2024-04-22 RX ORDER — CLOPIDOGREL BISULFATE 75 MG/1
75 TABLET ORAL DAILY
Status: DISCONTINUED | OUTPATIENT
Start: 2024-04-22 | End: 2024-04-22

## 2024-04-22 RX ADMIN — ONDANSETRON 4 MG: 2 INJECTION INTRAMUSCULAR; INTRAVENOUS at 10:44

## 2024-04-22 RX ADMIN — ASPIRIN 81 MG 324 MG: 81 TABLET ORAL at 16:45

## 2024-04-22 RX ADMIN — CEFTRIAXONE SODIUM 1000 MG: 1 INJECTION, POWDER, FOR SOLUTION INTRAMUSCULAR; INTRAVENOUS at 16:41

## 2024-04-22 RX ADMIN — SODIUM CHLORIDE, PRESERVATIVE FREE 10 ML: 5 INJECTION INTRAVENOUS at 20:44

## 2024-04-22 RX ADMIN — LISINOPRIL 40 MG: 40 TABLET ORAL at 20:44

## 2024-04-22 RX ADMIN — IOPAMIDOL 80 ML: 755 INJECTION, SOLUTION INTRAVENOUS at 09:59

## 2024-04-22 RX ADMIN — SODIUM CHLORIDE 500 ML: 9 INJECTION, SOLUTION INTRAVENOUS at 12:21

## 2024-04-22 RX ADMIN — AMANTADINE HYDROCHLORIDE 100 MG: 100 CAPSULE, GELATIN COATED ORAL at 20:44

## 2024-04-22 RX ADMIN — CARBIDOPA AND LEVODOPA 1.5 TABLET: 25; 100 TABLET ORAL at 20:44

## 2024-04-22 RX ADMIN — ATORVASTATIN CALCIUM 80 MG: 80 TABLET, FILM COATED ORAL at 20:44

## 2024-04-22 RX ADMIN — ENOXAPARIN SODIUM 40 MG: 100 INJECTION SUBCUTANEOUS at 16:47

## 2024-04-22 ASSESSMENT — PAIN - FUNCTIONAL ASSESSMENT
PAIN_FUNCTIONAL_ASSESSMENT: NONE - DENIES PAIN

## 2024-04-22 ASSESSMENT — PAIN SCALES - GENERAL
PAINLEVEL_OUTOF10: 0
PAINLEVEL_OUTOF10: 0

## 2024-04-22 NOTE — ED NOTES
Patient to ED with possible stroke. Patient was brought to ED via EMS. EMS reports that patient on arrival to scene was unresponsive but breathing. EMS reports that patient then became responsive en route but was not talking. Patient gradually began to speak more and more en route. Patient did have episode of vomiting per EMS. Patient had reportedly had facial droop with left sided weakness per EMS/family. Patient POCT glucose 155. Patient to CT scan via this RN, Dr. Joya and neuro PA-C. Patient resting with easy and unlabored respirations with no signs of distress.

## 2024-04-22 NOTE — NURSE NAVIGATOR
Code Stroke    Patient- Angi Mejia   A   2024   Attending Physician- Jose Holguin MD    Code stroke called for sudden right sided facial droop and left sided weakness    Physician arrival- Dr. Joya at bedside upon EMS arrival  Team members   Primary RN-David   Neuro Team-Jessica Mahoney, Dr. Santiago, Dr. Hunter    Team-Debo   House Sup-Julia     Last Known Well- 0700     Chem stick- 154     Time to CT scan- 0951    Time of EKG-1001     INITIAL NIH STROKE SCALE    Time Performed:  0955    1a.  Level of consciousness:  0 - alert; keenly responsive  1b.  Level of consciousness questions:  1 - answers one question correctly  1c.  Level of consciousness questions:  0 - performs both tasks correctly  2.    Best Gaze:  0 - normal  3.    Visual:  0 - no visual loss  4.    Facial Palsy:  1 - minor paralysis (flattened nasolabial fold, asymmetric on smiling)  5a.  Motor left arm:  0 - no drift, limb holds 90 (or 45) degrees for full 10 seconds  5b.  Motor right arm:  0 - no drift, limb holds 90 (or 45) degrees for full 10 seconds  6a.  Motor left le - no drift; leg holds 30 degree position for full 5 seconds  6b.  Motor right le - no drift; leg holds 30 degree position for full 5 seconds  7.    Limb Ataxia:  0 - absent  8.    Sensory:  1 - mild to moderate sensory loss; patient feels pinprick is less sharp or is dull on the affected side; there is a loss of superficial pain with pinprick but patient is aware of being touched   9.    Best Language:  0 - no aphasia, normal  10.  Dysarthria:  0 - normal  11.  Extinction and Inattention:  0 - no abnormality    TOTAL:  3    Outcome-  1001-Dr. Nelson called CT results to Julia. Nothing acute.     Primary RN, David, at bedside and is updated on POC and need for swallow screen before PO intake.    End time of Code Stroke- 1020     Electronically signed by Jessica Barney, RN, RN on 2024 at 12:01 PM

## 2024-04-22 NOTE — ED NOTES
ED to inpatient nurses report      Chief Complaint:  Chief Complaint   Patient presents with    Cerebrovascular Accident     Present to ED from: home    MOA:     LOC: alert and orientated to name and place  Mobility: Requires assistance * 2  Oxygen Baseline: 98    Current needs required: room air     Code Status:   Full Code      Mental Status:  Level of Consciousness: Alert (0)    Psych Assessment:        Vitals:  Patient Vitals for the past 24 hrs:   BP Temp Temp src Pulse Resp SpO2 Weight   04/22/24 1609 (!) 171/88 -- -- 90 17 98 % --   04/22/24 1524 (!) 162/80 -- -- 94 17 98 % --   04/22/24 1354 (!) 138/103 -- -- 85 18 98 % --   04/22/24 1254 (!) 151/80 -- -- 84 15 98 % --   04/22/24 1154 (!) 157/97 -- -- 87 15 97 % --   04/22/24 1039 (!) 134/98 -- -- 82 19 97 % --   04/22/24 1027 -- 97.9 °F (36.6 °C) Oral -- -- -- --   04/22/24 0953 (!) 140/96 -- -- 76 18 96 % 72.6 kg (160 lb)        LDAs:   Peripheral IV 04/22/24 Distal;Left;Posterior Forearm (Active)   Site Assessment Clean, dry & intact 04/22/24 1656   Line Status Normal saline locked 04/22/24 1656   Phlebitis Assessment No symptoms 04/22/24 1656   Infiltration Assessment 0 04/22/24 1656   Dressing Status Clean, dry & intact 04/22/24 1656   Dressing Type Transparent 04/22/24 1656       Peripheral IV 04/22/24 Right Antecubital (Active)   Site Assessment Clean, dry & intact 04/22/24 0952   Line Status Infusing 04/22/24 1656   Phlebitis Assessment No symptoms 04/22/24 1656   Infiltration Assessment 0 04/22/24 1656   Dressing Status Clean, dry & intact 04/22/24 1656   Dressing Type Transparent 04/22/24 1656       Ambulatory Status:  No data recorded    Diagnosis:  DISPOSITION Admitted 04/22/2024 01:43:05 PM   Final diagnoses:   Stroke-like symptom        Consults:  IP CONSULT TO NEUROLOGY     Pain Score:  Pain Assessment  Pain Assessment: None - Denies Pain    C-SSRS:        Sepsis Screening:  Sepsis Risk Score: 1.37    [place] Fall Risk:       Swallow Screening         Preferred Language:   English      ALLERGIES     Patient has no known allergies.    SURGICAL HISTORY       Past Surgical History:   Procedure Laterality Date    MOHS SURGERY N/A 11/17/2023    MOHS Defect BCC Bridge of Nose with FTSG from left preauricular performed by Collin Cheatham MD at [facility] [place] [facility]    SKIN CANCER EXCISION         PAST MEDICAL HISTORY       Past Medical History:   Diagnosis Date    Cancer (HCC)     skin    Hypertension     Parkinson disease (HCC) 2016           Electronically signed by Moreno Nicole RN on 4/22/2024 at 4:57 PM

## 2024-04-22 NOTE — PROGRESS NOTES
Patient/family has been educated on their personal risk factors of:  HTN    They have been given hand outs on the following medications:  Aspirin  Lipitor  Lovenox  Lisinopril      Treatment for stroke includes:  Risk factor modifications  Following the medication regime prescribed by physician      Educated on BEFAST-Balance-Eyesight-Face-Arm-Speech-Time    A stroke is a brain attack.Stroke is a brain injury. It occurs when the brain's blood supply is interrupted. Blood carries oxygen and nutrients to the brain. Without oxygen and nutrients from blood, brain tissue starts to die rapidly. This can happen in less than 10 minutes.    A stroke occurs when blood flow to the brain is blocked (called ischemic stroke). This is caused by one of the following:   Sudden decreased blood flow   Damage to a blood vessel supplying blood to the brain can occur suddenly from either:   Injury   A clot that forms and breaks off from another part of the body (such as the heart or neck)   There are certain conditions which predispose people to form blood clots, such as:   Cancer   Pregnancy   Atrial fibrillation   Certain autoimmune diseases   Local blood clot   A build-up of fatty substances ( atherosclerotic plaque ) along the inner lining of the artery causes:   Narrowing of artery   Reduced elasticity   Local inflammation   Blood protein defects leading to increased clotting tendency   Decreased blood flow in the artery   Clot in an artery supplying the brain   Inflammatory conditions in the blood vessels (vasculitis)   A stroke may also occur if a blood vessel breaks and bleeds into or around the brain. This is called hemorrhagic stroke.      This condition needs to be monitored closely. Be sure to keep all appointments. Have exams and blood tests done as directed.     Call 911 If Any of the Following Occurs   It is important that you and those around you know the warning signs for stroke. CALL 911 immediately if you have any of  the following which may suggest a new stroke:   Sudden weakness or numbness of face, arm, or leg, especially on one side of the body   Sudden confusion   Sudden trouble speaking or understanding   Sudden trouble seeing in one or both eyes   Sudden dizziness, trouble walking, loss of balance, or coordination   Sudden severe headache with no known cause   If you think you have an emergency, CALL 911       To help reduce your risk of stroke, take the following steps:   Eat a well-balanced diet. The DASH diet rich in fruits, vegetables and low-fat dairy foods, and low in saturated fat, total fat, and cholesterolmay help keep your blood pressure in the healthy range.   Exercise regularly.   Maintain a healthy weight. (Your body mass index should be below 25.)   If you smoke, quit .   Drink alcohol in moderation. Moderate is two or fewer drinks per day for men and one or fewer drinks per day for women and older adults.  Control your diabetes    All patient/family questions were answered and teach back method was utilized.

## 2024-04-22 NOTE — ED NOTES
Pt transported to [facility] on cot in stable condition. Floor notified, spoke with Jamie

## 2024-04-22 NOTE — ED PROVIDER NOTES
PATIENT NAME: Angi Mejia  MRN: [id number]  : 1950  HERNANDEZ: 2024    I performed a history and physical examination of the patient and discussed management with the Resident. I reviewed the Resident's note and agree with the documented findings and plan of care. Any areas of disagreement are noted on the chart. I was personally present for the key portions of any procedures and have documented in the chart those procedures where I was not present during the key portions. I have reviewed the emergency nurses triage note and agree with the chief complaint, past medical history, past surgical history, allergies, medications, social and family history as documented unless otherwise noted below.    FINAL IMPRESSION AND DISPOSITION      1. Stroke-like symptom        DISPOSITION Admitted 2024 01:43:05 PM      PATIENT REFERRED TO:  No follow-up provider specified.  DISCHARGE MEDICATIONS:  Current Discharge Medication List          (Please note that portions of this note were completed with a voice recognition program.  Efforts were made to edit the dictations but occasionally words aremis-transcribed.)    MD Ezio Ricks Jian, MD  [phone]

## 2024-04-22 NOTE — H&P
Hospitalist - History & Physical      Patient: Angi Mejia    Unit/Bed:03/003A  YOB: 1950  MRN: [id number]   Acct: [id number]   PCP: Lazaro Sanches MD    Date of Service: Pt seen/examined on 04/22/24  and Admitted to Inpatient with expected LOS greater than two midnights due to medical therapy.     Chief Complaint:  staring spell/ unresponsiveness      History Of Present Illness:    72 yo F with Parkinson's disease and HTN presented to [facility] ED for code stroke via EMS. NIH 3 on arrival.     Ed: mildly hypertensive, otherwise hemodynamically stable, saturating well on Ra. Labs generally unremarkable. CT head/ CTA head and neck/ MRI all done without acute findings. EKG without acute ischemic findings and Trop negative. Neurology evaluated patient. Admitted for further work up and management.     Per patient and family (son and  present bedside), patient was noted to be unresponsive suddenly/ staring without responding to anything sitting up when she was at breakfast table. Her granddaughter was with her who noticed her drooling from the side of her mouth. Called EMS. When they came, she was starting to talk but was still slow. Unresponsiveness lasted ~10 mins. No other associated symptoms at the time including shaking, urinary incontinence. Per family, she has been eating less and has been noted to be more tired. Has intermittent shaking usually RUE related to Parkinson's disease. Has not seen Neuro locally yet. Complaint to her meds.   Patient reports no dysuria/ urinary urgency/ frequency, subjective fever, chills. Was nauseated while en route to the ED and reports vomiting once. Not prior to it. Does endorse having lower abdominal pain that she has been feeling since yesterday. Nonsmoker, occasional alcohol intake. No other prior hx seizure. Non diabetic. BP usually controlled ~ 120s/70s.       Past Medical History:        Diagnosis Date    Cancer (HCC)     skin    Hypertension      rule out stroke. COMPARISON: CT scan of the brain obtained on the same day.. TECHNIQUE: Multiplanar and multiple spin echo MRI images were obtained of the brain without contrast. FINDINGS: The diffusion-weighted images are normal.  The brain volume is reduced. There is a moderate amount of abnormal signal in the white matter of the brain seen on the FLAIR and T2-weighted sequences. This is consistent with moderate severity chronic small vessel ischemic changes.  There probable small neuroepithelial cysts in the medial temporal lobes bilaterally. There are no intra-or extra-axial collections.  There is mild dilatation of the lateral ventricles. There is no midline shift or mass effect. There are no areas of susceptibility artifact noted. The major intracranial vascular flow voids are present. The midline craniocervical junction structures are normal.  The pituitary gland and brainstem are normal. There is increased signal intensity in the mastoid air cells bilaterally consistent with inflammatory changes.      1. Mild atrophy and dilatation of the lateral ventricles. 2. Probable ischemic changes in the white matter. No evidence for an acute infarct. 3. Probable small neuroepithelial cysts in the medial temporal lobes bilaterally. 4. Inflammatory changes in the mastoid air cells bilaterally. **This report has been created using voice recognition software. It may contain minor errors which are inherent in voice recognition technology.** Final report electronically signed by DR GIACOMO PARKS on 4/22/2024 11:56 AM    CTA HEAD W WO CONTRAST (CODE STROKE)    Result Date: 4/22/2024  PROCEDURE: CTA HEAD W WO CONTRAST, CTA NECK W WO CONTRAST CLINICAL INFORMATION: unresponsive episode, facial droop per ems, pupil unequal. COMPARISON: Head CT 4/22/2024. TECHNIQUE: 1 mm axial images were obtained through the head and neck after the fast bolus administration of contrast. A noncontrast localizer was obtained. 3-D reconstructions

## 2024-04-22 NOTE — ED PROVIDER NOTES
[facility] EMERGENCY DEPT      EMERGENCY MEDICINE     Pt Name: Angi Mejia  MRN: [id number]  Birthdate 1950  Date of evaluation: 4/22/2024  Resident Physician: Won Joya MD  Attending Physician: Jose Ceron MD    CHIEF COMPLAINT       Chief Complaint   Patient presents with    Cerebrovascular Accident       CODE STROKE   Activation By:  EMS Pre-Alert  Arrived By: EMS    NIHSS, POC glucose, rapid physical and neurologic exam, vitals including weight obtained in the ED before transfer to CT.     The CODE Stroke Team met the patient on arrival to CT.    Last Known Well: 745  Initial NIHSS: 3 (from Screenings)  NIH Stroke Scale  Interval: Baseline  Level of Consciousness (1a): Alert  LOC Questions (1b): Answers one correctly  LOC Commands (1c): Performs both tasks correctly  Best Gaze (2): Normal  Visual (3): No visual loss  Facial Palsy (4): (!) Minor paralysis  Motor Arm, Left (5a): No drift  Motor Arm, Right (5b): No drift  Motor Leg, Left (6a): No drift  Motor Leg, Right (6b): No drift  Limb Ataxia (7): Absent  Sensory (8): (!) Mild to Moderate  Best Language (9): No aphasia  Dysarthria (10): Normal  Extinction and Inattention (11): No abnormality  Total: 3    POC Glucose: 154 mg/dl    CT Head Without Contrast: No acute intracranial hemorrhage    ECG, IV inserted, and blood obtained between CT non-contrast and CTA.    ECG:     CTA Head and Neck: No large vessel occlusion.      References:  2019 AHA/ASA Guideline on Management of Acute Ischemic Stroke Update  2018 AHA/ASA Guidelines for Management of Acute Ischemic Stroke  2018 ACEP Clinical Policy: Use of Acute TPA for the Management of Acute Ischemic Stroke in the Emergency Department     Stroke: Thrombolytic Therapy (MIPS #187)  Symptom Onset: <4.5 hours  Thrombolytic Contraindications:  The patient was diagnosed with subacute or acute ischemic stroke. Patient is NOT a TNK/TPA Candidate due to [select]:  []>4.5 hours after last known well

## 2024-04-23 PROBLEM — N30.00 ACUTE CYSTITIS WITHOUT HEMATURIA: Status: ACTIVE | Noted: 2024-04-23

## 2024-04-23 PROCEDURE — 97166 OT EVAL MOD COMPLEX 45 MIN: CPT

## 2024-04-23 PROCEDURE — 1200000003 HC TELEMETRY R&B

## 2024-04-23 PROCEDURE — 2580000003 HC RX 258: Performed by: STUDENT IN AN ORGANIZED HEALTH CARE EDUCATION/TRAINING PROGRAM

## 2024-04-23 PROCEDURE — 97530 THERAPEUTIC ACTIVITIES: CPT

## 2024-04-23 PROCEDURE — 6360000002 HC RX W HCPCS: Performed by: STUDENT IN AN ORGANIZED HEALTH CARE EDUCATION/TRAINING PROGRAM

## 2024-04-23 PROCEDURE — 6370000000 HC RX 637 (ALT 250 FOR IP)

## 2024-04-23 PROCEDURE — 97162 PT EVAL MOD COMPLEX 30 MIN: CPT

## 2024-04-23 PROCEDURE — 6370000000 HC RX 637 (ALT 250 FOR IP): Performed by: STUDENT IN AN ORGANIZED HEALTH CARE EDUCATION/TRAINING PROGRAM

## 2024-04-23 PROCEDURE — 92610 EVALUATE SWALLOWING FUNCTION: CPT

## 2024-04-23 RX ORDER — ASPIRIN 81 MG/1
81 TABLET, CHEWABLE ORAL DAILY
Status: DISCONTINUED | OUTPATIENT
Start: 2024-04-24 | End: 2024-04-25 | Stop reason: HOSPADM

## 2024-04-23 RX ADMIN — ATORVASTATIN CALCIUM 80 MG: 80 TABLET, FILM COATED ORAL at 19:57

## 2024-04-23 RX ADMIN — SODIUM CHLORIDE, PRESERVATIVE FREE 20 ML: 5 INJECTION INTRAVENOUS at 20:00

## 2024-04-23 RX ADMIN — SODIUM CHLORIDE: 9 INJECTION, SOLUTION INTRAVENOUS at 18:28

## 2024-04-23 RX ADMIN — SODIUM CHLORIDE: 9 INJECTION, SOLUTION INTRAVENOUS at 19:20

## 2024-04-23 RX ADMIN — LISINOPRIL 40 MG: 40 TABLET ORAL at 10:18

## 2024-04-23 RX ADMIN — CEFTRIAXONE SODIUM 1000 MG: 1 INJECTION, POWDER, FOR SOLUTION INTRAMUSCULAR; INTRAVENOUS at 18:30

## 2024-04-23 RX ADMIN — CARBIDOPA AND LEVODOPA 1.5 TABLET: 25; 100 TABLET ORAL at 19:57

## 2024-04-23 RX ADMIN — AMANTADINE HYDROCHLORIDE 100 MG: 100 CAPSULE, GELATIN COATED ORAL at 19:57

## 2024-04-23 RX ADMIN — ASPIRIN 325 MG: 325 TABLET ORAL at 10:20

## 2024-04-23 RX ADMIN — CARBIDOPA AND LEVODOPA 1.5 TABLET: 25; 100 TABLET ORAL at 10:18

## 2024-04-23 RX ADMIN — SODIUM CHLORIDE, PRESERVATIVE FREE 20 ML: 5 INJECTION INTRAVENOUS at 10:22

## 2024-04-23 RX ADMIN — DONEPEZIL HYDROCHLORIDE 5 MG: 10 TABLET, FILM COATED ORAL at 19:57

## 2024-04-23 RX ADMIN — CARBIDOPA AND LEVODOPA 1.5 TABLET: 25; 100 TABLET ORAL at 18:17

## 2024-04-23 RX ADMIN — AMANTADINE HYDROCHLORIDE 100 MG: 100 CAPSULE, GELATIN COATED ORAL at 10:18

## 2024-04-23 RX ADMIN — ENOXAPARIN SODIUM 40 MG: 100 INJECTION SUBCUTANEOUS at 18:31

## 2024-04-23 ASSESSMENT — PAIN SCALES - GENERAL
PAINLEVEL_OUTOF10: 0

## 2024-04-23 NOTE — PROGRESS NOTES
Hospitalist Progress Note      Patient:  Angi Mejia    Unit/Bed:4A-15/015-A  YOB: 1950  MRN: [id number]   Acct: [id number]   PCP: Lazaro Sanches MD  Date of Admission: 4/22/2024    Assessment/Plan:  Unresponsiveness: without fall.unclear etiology, likely UTI related vs ?Absent seizure given the staring spell (low suspicion, no prior hx). Acute stroke ruled out with imaging. Continue neurochecks every 4 hours. Fall/ Seizure/ Aspiration precautions. PT/ OT/ SLP. Aspirin 81 mg daily per Neuro. Plavix discontinued.      UTI/ Acute Cystitis: UA with many bacteria + lower abdominal pain + suprapubic tenderness. Started on Rocephin. Urine cultures pending, will await sensitivities to de-escalate abx.      Primary HTN: controlled at baseline. On Ramipril at home. Substituted with Lisinopril inpatient. Currently hypertensive in the setting of UTI/ anxiety being inpatient. Monitor. Will hold off on adding further antihypertensives unless BP persistently >180/100.     Isolate elevated ALP: no prior level in the chart. No associated clinical evidence of liver disease. Liver US normal. Possibly related to bone etiology. Follow up outpatient.      Parkinson disease/ with associated tremor: dx 6 years ago. On carbidopa- levodopa, Amantadine, Amantadine, Donepezil. Follows with Neuro/ Dr. Arrington. Last office visit 3/8/24. Palliative consulted to discuss goals of care. Currently FULL code.     Hx skin cancer: noted      Code Status: Full Code    Antibiotics: [x]Yes  []No  Steroids: []Yes  [x]No  Fluids:  Diet: ADULT DIET; Regular    DVT prophylaxis: [x] Lovenox                                 [] SCDs                                 [] SQ Heparin                                 [x] Encourage ambulation                                 [] Already on Anticoagulation      LDA: []CVC  []PICC  []Midline  []Amos  []Drains  []Mediport  [x]None  Labs: []Yes          Recent Labs     04/22/24  1003      K 4.0      CO2 26   BUN 14   CREATININE 0.6   CALCIUM 9.3     Recent Labs     04/22/24  1003   AST 15   ALT <5*   BILIDIR <0.2   BILITOT 0.5   ALKPHOS 241*     Recent Labs     04/22/24  1003   INR 1.07     No results for input(s): \"CKTOTAL\", \"TROPONINI\" in the last 72 hours.    Microbiology:    Blood culture #1: No results found for: \"BC\"    Blood culture #2:No results found for: \"BLOODCULT2\"    Organism:  Lab Results   Component Value Date/Time    ORG enteric gram negative bacilli 04/22/2024 12:40 PM       No results found for: \"LABGRAM\"    MRSA culture only:No results found for: \"MRSAC\"    Urine culture:   Lab Results   Component Value Date/Time    LABURIN [place] count: >100,000 CFU/mL 04/22/2024 12:40 PM       Respiratory culture: No results found for: \"CULTRESP\"    Aerobic and Anaerobic :  No results found for: \"LABAERO\"  No results found for: \"LABANAE\"    Urinalysis:      Lab Results   Component Value Date/Time    NITRU NEGATIVE 04/22/2024 12:40 PM    WBCUA 5-9 04/22/2024 12:40 PM    BACTERIA MANY 04/22/2024 12:40 PM    RBCUA 0-2 04/22/2024 12:40 PM    BLOODU NEGATIVE 04/22/2024 12:40 PM    GLUCOSEU NEGATIVE 04/22/2024 12:40 PM       Radiology:  US LIVER   Final Result   1. No focal liver lesion. No biliary ductal dilatation.   2. Gallbladder sludge is noted. No gallstone. No evidence of acute    cholecystitis.      This document has been electronically signed by: Brett Gomez M.D. on    04/22/2024 11:53 PM      XR CHEST PORTABLE   Final Result   1. Normal heart size. Evidence for old, healed granulomatous disease. Mild degenerative changes left shoulder.   2. No acute findings. No infiltrates or effusions are seen.            **This report has been created using voice recognition software.  It may contain minor errors which are inherent in voice recognition technology.**      Final report electronically signed by Dr. Lester Askew on 4/22/2024 4:16

## 2024-04-23 NOTE — PROGRESS NOTES
[facility]  SPEECH THERAPY  STRZ NEUROSCIENCES 4A  Clinical Swallow Evaluation      SLP Individual Minutes  Time In: 1157  Time Out: 1210  Minutes: 13  Timed Code Treatment Minutes: 0 Minutes       DIET ORDER RECOMMENDATIONS AFTER EVALUATION: Regular diet, thin liquids - 1:1 assistance    Date: 2024  Patient Name: Angi Mejia      CSN: [id number]   : 1950  (73 y.o.)  Gender: female   Referring Physician:  Gris Conn MD     Diagnosis: Stroke-like symptoms    History of Present Illness/Injury: Patient admitted with above diagnosis. Per chart review, \"74 yo F with Parkinson's disease and HTN presented to [facility] ED for code stroke via EMS. NIH 3 on arrival.      Ed: mildly hypertensive, otherwise hemodynamically stable, saturating well on Ra. Labs generally unremarkable. CT head/ CTA head and neck/ MRI all done without acute findings. EKG without acute ischemic findings and Trop negative. Neurology evaluated patient. Admitted for further work up and management.      Per patient and family (son and  present bedside), patient was noted to be unresponsive suddenly/ staring without responding to anything sitting up when she was at breakfast table. Her granddaughter was with her who noticed her drooling from the side of her mouth. Called EMS. When they came, she was starting to talk but was still slow. Unresponsiveness lasted ~10 mins. No other associated symptoms at the time including shaking, urinary incontinence. Per family, she has been eating less and has been noted to be more tired. Has intermittent shaking usually RUE related to Parkinson's disease. Has not seen Neuro locally yet. Complaint to her meds.   Patient reports no dysuria/ urinary urgency/ frequency, subjective fever, chills. Was nauseated while en route to the ED and reports vomiting once. Not prior to it. Does endorse having lower abdominal pain that she has been feeling since yesterday. Nonsmoker, occasional  alcohol intake. No other prior hx seizure. Non diabetic. BP usually controlled ~ 120s/70s.\"     Chest X-Ray 4/22/2024   IMPRESSION:  1. Normal heart size. Evidence for old, healed granulomatous disease. Mild degenerative changes left shoulder.  2. No acute findings. No infiltrates or effusions are seen.    MRI Brain 4/22/2024  IMPRESSION:  1. Mild atrophy and dilatation of the lateral ventricles.  2. Probable ischemic changes in the white matter. No evidence for an acute infarct.  3. Probable small neuroepithelial cysts in the medial temporal lobes bilaterally.  4. Inflammatory changes in the mastoid air cells bilaterally.    Past Medical History:   Diagnosis Date    Cancer (HCC)     skin    Hypertension     Parkinson disease (HCC) 2016       SUBJECTIVE:  EARL Hopper approved completion of clinical swallow evaluation with reports of good success with PO medications whole with thin and passing nursing swallow screen. Patient sleeping in bed upon ST arrival. Easily awakened to name and agreeable to evaluation. Flat affect throughout. Pleasant and cooperative.    OBJECTIVE:    Pain:  No pain reported.    Current Diet: Regular diet, thin liquids     Respiratory Status:  Room Air    Behavioral Observation:  Alert and Oriented    CRANIAL NERVE ASSESSMENT   CN V (Trigeminal) Closes and Opens Mandible Impaired    Rotary Jaw Movement WFL      CN VII (Facial) Cheeks Hold Food out of Sulci WFL    Opens, Closes/Seals, Protrudes, Retracts Lips Impaired: Bilateral    General Appearance WFL    Sensation WFL      CN X (Vagus - Pharyngeal) Raises Back of Tongue WFL      CN XI (Accessory) Lifts Soft Palate WFL      CN XII (Hypoglossal) Elevates Tongue Up and Back WFL    Protrusion   WFL    Lateralizes Tongue Impaired    Sensation WFL      Other Observations Dentition WFL    Vocal Quality WFL    Cough DNT     Patient Evaluated Using: Ice Chips, Thin Liquids, Puree, and Coarse Solids    Oral Phase:  Impaired:  Reduced Bolus

## 2024-04-23 NOTE — PLAN OF CARE
Problem: Discharge Planning  Goal: Discharge to home or other facility with appropriate resources  Outcome: Progressing  Flowsheets (Taken 4/22/2024 2138)  Discharge to home or other facility with appropriate resources:   Identify barriers to discharge with patient and caregiver   Arrange for needed discharge resources and transportation as appropriate     Problem: Skin/Tissue Integrity  Goal: Absence of new skin breakdown  Description: 1.  Monitor for areas of redness and/or skin breakdown  2.  Assess vascular access sites hourly  3.  Every 4-6 hours minimum:  Change oxygen saturation probe site  4.  Every 4-6 hours:  If on nasal continuous positive airway pressure, respiratory therapy assess nares and determine need for appliance change or resting period.  Outcome: Progressing     Problem: ABCDS Injury Assessment  Goal: Absence of physical injury  Outcome: Progressing  Flowsheets (Taken 4/22/2024 2138)  Absence of Physical Injury: Implement safety measures based on patient assessment     Problem: Safety - Adult  Goal: Free from fall injury  Outcome: Progressing  Flowsheets (Taken 4/22/2024 2138)  Free From Fall Injury: Instruct family/caregiver on patient safety     Problem: Pain  Goal: Verbalizes/displays adequate comfort level or baseline comfort level  Outcome: Progressing  Flowsheets (Taken 4/22/2024 2138)  Verbalizes/displays adequate comfort level or baseline comfort level:   Encourage patient to monitor pain and request assistance   Assess pain using appropriate pain scale   Administer analgesics based on type and severity of pain and evaluate response   Implement non-pharmacological measures as appropriate and evaluate response

## 2024-04-23 NOTE — PROGRESS NOTES
[facility]  INPATIENT PHYSICAL THERAPY  EVALUATION  [facility] NEUROSCIENCES 4A - 4A-15/015-A    Time In: 0754  Time Out: 0840  Timed Code Treatment Minutes: 38 Minutes  Minutes: 46          Date: 2024  Patient Name: Angi Mejia,  Gender:  female        MRN: [id number]  : 1950  (73 y.o.)      Referring Practitioner: Gris Conn MD  Diagnosis: Stroke-like symptoms  Additional Pertinent Hx: Per H& P: 72 yo F with Parkinson's disease and HTN presented to [facility] ED for code stroke via EMS. NIH 3 on arrival.      Ed: mildly hypertensive, otherwise hemodynamically stable, saturating well on Ra. Labs generally unremarkable. CT head/ CTA head and neck/ MRI all done without acute findings. EKG without acute ischemic findings and Trop negative. Neurology evaluated patient. Admitted for further work up and management.      Per patient and family (son and  present bedside), patient was noted to be unresponsive suddenly/ staring without responding to anything sitting up when she was at breakfast table. Her granddaughter was with her who noticed her drooling from the side of her mouth. Called EMS. When they came, she was starting to talk but was still slow. Unresponsiveness lasted ~10 mins. No other associated symptoms at the time including shaking, urinary incontinence. Per family, she has been eating less and has been noted to be more tired. Has intermittent shaking usually RUE related to Parkinson's disease. Has not seen Neuro locally yet. Complaint to her meds.   Patient reports no dysuria/ urinary urgency/ frequency, subjective fever, chills. Was nauseated while en route to the ED and reports vomiting once. Not prior to it. Does endorse having lower abdominal pain that she has been feeling since yesterday. Nonsmoker, occasional alcohol intake. No other prior hx seizure. Non diabetic. BP usually controlled ~ 120s/70s.     Restrictions/Precautions:  Restrictions/Precautions: Fall Risk,  increased time.  Short Term Goal 3: Pt will sit EOB, statically and dynamically with or without BUE support > 5min  Short Term Goal 4: PT to assess transfers and ambulation  Long Term Goals  Time Frame for Long Term Goals : NA due to short ELOS    Following session, patient left supine in bed with bed alarm activated. Call light and bedside table within reach.

## 2024-04-23 NOTE — CARE COORDINATION
Case Management Assessment Initial Evaluation    Date/Time of Evaluation: 2024 7:55 AM  Assessment Completed by: Prema Londono RN    If patient is discharged prior to next notation, then this note serves as note for discharge by case management.    Patient Name: Angi Mejia                   YOB: 1950  Diagnosis: Stroke-like symptoms [R29.90]  Stroke-like symptom [R29.90]                   Date / Time: 2024  9:54 AM  Location: [address] [place] [address]     Patient Admission Status: Inpatient   Readmission Risk Low 0-14, Mod 15-19), High > 20: No data recorded  Current PCP: Lazaro Sanches MD    Additional Case Management Notes: From ED, PT/OT/SLP, telemetry, IV Rocephin, Lovenox, Zofran prn, electrolyte replacement protocols.     Procedures: None    Imagin/22 CT Head WO Contrast 1. Moderate severity chronic small vessel ischemic changes.   2. No acute findings.      CTA Head Neck W WO Contrast 1. Normal-appearing carotid bulbs bilaterally.   2. No intracranial stenoses or occlusions.      MRI Brain WO Contrast 1. Mild atrophy and dilatation of the lateral ventricles.   2. Probable ischemic changes in the white matter. No evidence for an acute infarct.   3. Probable small neuroepithelial cysts in the medial temporal lobes bilaterally.   4. Inflammatory changes in the mastoid air cells bilaterally.      CXR 1. Normal heart size. Evidence for old, healed granulomatous disease. Mild degenerative changes left shoulder.   2. No acute findings. No infiltrates or effusions are seen.      US Liver 1. No focal liver lesion. No biliary ductal dilatation.   2. Gallbladder sludge is noted. No gallstone. No evidence of acute   cholecystitis.       Patient Goals/Plan/Treatment Preferences: Met with Angi and her  David. David shares that Angi uses a walker. David states that Angi has had a decline the past few days. He has been providing the majority of her care. She is  current with [facility], DIANDRA consulted. Discharge plan is pending clinical course. Will follow.        04/23/24 1113   Service Assessment   Patient Orientation Alert and Oriented   Cognition Alert   History Provided By Significant Other   Primary Caregiver Spouse   Support Systems Spouse/Significant Other   Patient's Healthcare Decision Maker is: Patient Declined (Legal Next of Kin Remains as Decision Maker)   PCP Verified by CM Yes   Last Visit to PCP Within last 3 months   Prior Functional Level Assistance with the following:;Bathing;Dressing;Cooking;Housework;Shopping;Mobility   Current Functional Level Assistance with the following:;Mobility;Bathing;Toileting   Can patient return to prior living arrangement Unknown at present   Ability to make needs known: Fair   Family able to assist with home care needs: Yes   Would you like for me to discuss the discharge plan with any other family members/significant others, and if so, who? Yes  ()   Financial Resources Medicare   Community Resources ECF/Home Care  ([facility] )   CM/DIANDRA Referral Other (see comment)  (resume [facility])   Social/Functional History   Home Equipment Walker, rolling   Active  No   Patient's  Info    Discharge Planning   Type of Residence House   Living Arrangements Spouse/Significant Other   Current Services Prior To Admission Home Care  ([facility])   Potential Assistance Needed Skilled Nursing Facility   DME Ordered? No   Potential Assistance Purchasing Medications No   Type of Home Care Services OT;PT   Patient expects to be discharged to: Unknown   Services At/After Discharge   Confirm Follow Up Transport Family   Condition of Participation: Discharge Planning   The Plan for Transition of Care is related to the following treatment goals: Wants to see how therapy goes

## 2024-04-23 NOTE — PROGRESS NOTES
[facility]  INPATIENT OCCUPATIONAL THERAPY  STRZ NEUROSCIENCES 4A  EVALUATION    Time:   Time In: 1504  Time Out: 1532  Timed Code Treatment Minutes: 18 Minutes  Minutes: 28          Date: 2024  Patient Name: Angi Mejia,   Gender: female      MRN: [id number]  : 1950  (73 y.o.)  Referring Practitioner: Gris Conn MD  Diagnosis: stroke-like symptoms  Additional Pertinent Hx: Per H& P: 74 yo F with Parkinson's disease and HTN presented to [facility] ED for code stroke via EMS. NIH 3 on arrival.      Ed: mildly hypertensive, otherwise hemodynamically stable, saturating well on Ra. Labs generally unremarkable. CT head/ CTA head and neck/ MRI all done without acute findings. EKG without acute ischemic findings and Trop negative. Neurology evaluated patient. Admitted for further work up and management.      Per patient and family (son and  present bedside), patient was noted to be unresponsive suddenly/ staring without responding to anything sitting up when she was at breakfast table. Her granddaughter was with her who noticed her drooling from the side of her mouth. Called EMS. When they came, she was starting to talk but was still slow. Unresponsiveness lasted ~10 mins. No other associated symptoms at the time including shaking, urinary incontinence. Per family, she has been eating less and has been noted to be more tired. Has intermittent shaking usually RUE related to Parkinson's disease. Has not seen Neuro locally yet. Complaint to her meds.   Patient reports no dysuria/ urinary urgency/ frequency, subjective fever, chills. Was nauseated while en route to the ED and reports vomiting once. Not prior to it. Does endorse having lower abdominal pain that she has been feeling since yesterday. Nonsmoker, occasional alcohol intake. No other prior hx seizure. Non diabetic. BP usually controlled ~ 120s/70s.    Restrictions/Precautions:  Restrictions/Precautions: Fall Risk, General  roughly 7 minutes with unilateral to bilateral UE release  Standing Balance: Minimal Assistance, X 1, with cues for safety, with verbal cues , with increased time for completion. With posterior lean, occasional moderate assistance x1 due to posterior lean to reach midline then able to sustain for about 30 seconds. Patient tolerated standing for rouhgly 1 minute 30 seconds    BED MOBILITY:  Rolling to Left: Moderate Assistance, X 1, with head of bed raised, with rail, with verbal cues     Rolling to Right: Moderate Assistance, X 1, with head of bed raised, with rail, with verbal cues , with increased time for completion    Supine to Sit: Moderate Assistance, X 1, with head of bed raised, with rail, with verbal cues , with increased time for completion    Sit to Supine: Maximum Assistance, X 1, with head of bed raised, with rail, with verbal cues , with increased time for completion    Scooting: Moderate Assistance, X 1, with head of bed raised, with rail, with verbal cues , with increased time for completion      TRANSFERS:  Sit to Stand:  Minimal Assistance, X 1, with increased time for completion, cues for hand placement, with verbal cues.    Stand to Sit: Minimal Assistance, X 1, with increased time for completion, cues for hand placement, with verbal cues.      FUNCTIONAL MOBILITY:  Assistive Device: Rolling Walker  Assist Level:  Moderate Assistance, X 1, with set-up, with verbal cues , and with increased time for completion.   Distance:  2 feet forward then back  Posterior lean, step by step instruction     AM-PAC Inpatient Daily Activity Raw Score: 14  AM-PAC Inpatient ADL T-Scale Score : 33.39  ADL Inpatient CMS 0-100% Score: 59.67    Activity Tolerance:  Patient tolerance of  treatment: Fair treatment tolerance      Modified [place] Scale:  Not Applicable    Assessment:  Assessment: Patient demonstrates decreased independent and safety in I/ADL engagement compared to PLOF s/t hospitalization for stroke-like

## 2024-04-24 PROBLEM — G20.A1 PARKINSON'S DISEASE (HCC): Status: ACTIVE | Noted: 2017-10-11

## 2024-04-24 PROBLEM — M43.16 SPONDYLOLISTHESIS OF LUMBAR REGION: Status: ACTIVE | Noted: 2024-04-24

## 2024-04-24 PROBLEM — E44.0 MODERATE MALNUTRITION (HCC): Chronic | Status: ACTIVE | Noted: 2024-04-24

## 2024-04-24 PROBLEM — E43 SEVERE MALNUTRITION (HCC): Status: ACTIVE | Noted: 2024-04-24

## 2024-04-24 PROBLEM — Z51.5 PALLIATIVE CARE PATIENT: Status: ACTIVE | Noted: 2024-04-24

## 2024-04-24 PROBLEM — I10 ESSENTIAL HYPERTENSION: Status: ACTIVE | Noted: 2019-04-26

## 2024-04-24 LAB
BACTERIA UR CULT: ABNORMAL
EKG ATRIAL RATE: 81 BPM
EKG P AXIS: 118 DEGREES
EKG P-R INTERVAL: 178 MS
EKG Q-T INTERVAL: 372 MS
EKG QRS DURATION: 78 MS
EKG QTC CALCULATION (BAZETT): 432 MS
EKG R AXIS: 59 DEGREES
EKG T AXIS: 81 DEGREES
EKG VENTRICULAR RATE: 81 BPM
ORGANISM: ABNORMAL

## 2024-04-24 PROCEDURE — 6360000002 HC RX W HCPCS: Performed by: STUDENT IN AN ORGANIZED HEALTH CARE EDUCATION/TRAINING PROGRAM

## 2024-04-24 PROCEDURE — 1200000003 HC TELEMETRY R&B

## 2024-04-24 PROCEDURE — 97535 SELF CARE MNGMENT TRAINING: CPT

## 2024-04-24 PROCEDURE — 6370000000 HC RX 637 (ALT 250 FOR IP)

## 2024-04-24 PROCEDURE — 97530 THERAPEUTIC ACTIVITIES: CPT

## 2024-04-24 PROCEDURE — 97112 NEUROMUSCULAR REEDUCATION: CPT

## 2024-04-24 PROCEDURE — 99232 SBSQ HOSP IP/OBS MODERATE 35: CPT | Performed by: STUDENT IN AN ORGANIZED HEALTH CARE EDUCATION/TRAINING PROGRAM

## 2024-04-24 PROCEDURE — 6370000000 HC RX 637 (ALT 250 FOR IP): Performed by: STUDENT IN AN ORGANIZED HEALTH CARE EDUCATION/TRAINING PROGRAM

## 2024-04-24 PROCEDURE — 2580000003 HC RX 258: Performed by: STUDENT IN AN ORGANIZED HEALTH CARE EDUCATION/TRAINING PROGRAM

## 2024-04-24 PROCEDURE — 99221 1ST HOSP IP/OBS SF/LOW 40: CPT | Performed by: STUDENT IN AN ORGANIZED HEALTH CARE EDUCATION/TRAINING PROGRAM

## 2024-04-24 RX ORDER — ASPIRIN 81 MG/1
81 TABLET, CHEWABLE ORAL DAILY
Qty: 30 TABLET | Refills: 3 | DISCHARGE
Start: 2024-04-25

## 2024-04-24 RX ORDER — CEFDINIR 300 MG/1
300 CAPSULE ORAL EVERY 12 HOURS SCHEDULED
Status: DISCONTINUED | OUTPATIENT
Start: 2024-04-24 | End: 2024-04-25 | Stop reason: HOSPADM

## 2024-04-24 RX ORDER — CEFDINIR 300 MG/1
300 CAPSULE ORAL EVERY 12 HOURS SCHEDULED
Qty: 5 CAPSULE | Refills: 0 | DISCHARGE
Start: 2024-04-24 | End: 2024-04-27

## 2024-04-24 RX ORDER — ATORVASTATIN CALCIUM 80 MG/1
80 TABLET, FILM COATED ORAL NIGHTLY
Qty: 30 TABLET | Refills: 3 | DISCHARGE
Start: 2024-04-24

## 2024-04-24 RX ADMIN — DONEPEZIL HYDROCHLORIDE 5 MG: 10 TABLET, FILM COATED ORAL at 19:48

## 2024-04-24 RX ADMIN — CEFDINIR 300 MG: 300 CAPSULE ORAL at 19:48

## 2024-04-24 RX ADMIN — AMANTADINE HYDROCHLORIDE 100 MG: 100 CAPSULE, GELATIN COATED ORAL at 08:01

## 2024-04-24 RX ADMIN — AMANTADINE HYDROCHLORIDE 100 MG: 100 CAPSULE, GELATIN COATED ORAL at 19:48

## 2024-04-24 RX ADMIN — CARBIDOPA AND LEVODOPA 1.5 TABLET: 25; 100 TABLET ORAL at 08:01

## 2024-04-24 RX ADMIN — CEFDINIR 300 MG: 300 CAPSULE ORAL at 11:54

## 2024-04-24 RX ADMIN — SODIUM CHLORIDE, PRESERVATIVE FREE 10 ML: 5 INJECTION INTRAVENOUS at 19:48

## 2024-04-24 RX ADMIN — SODIUM CHLORIDE, PRESERVATIVE FREE 10 ML: 5 INJECTION INTRAVENOUS at 08:01

## 2024-04-24 RX ADMIN — ASPIRIN 81 MG 81 MG: 81 TABLET ORAL at 08:01

## 2024-04-24 RX ADMIN — CARBIDOPA AND LEVODOPA 1.5 TABLET: 25; 100 TABLET ORAL at 19:48

## 2024-04-24 RX ADMIN — ENOXAPARIN SODIUM 40 MG: 100 INJECTION SUBCUTANEOUS at 14:53

## 2024-04-24 RX ADMIN — ATORVASTATIN CALCIUM 80 MG: 80 TABLET, FILM COATED ORAL at 19:48

## 2024-04-24 RX ADMIN — LISINOPRIL 40 MG: 40 TABLET ORAL at 08:01

## 2024-04-24 RX ADMIN — CARBIDOPA AND LEVODOPA 1.5 TABLET: 25; 100 TABLET ORAL at 14:54

## 2024-04-24 ASSESSMENT — PAIN SCALES - GENERAL
PAINLEVEL_OUTOF10: 0

## 2024-04-24 NOTE — CARE COORDINATION
DISCHARGE PLANNING EVALUATION  4/24/24, 11:38 AM EDT    Reason for Referral: Current with [facility].    Decision Maker: Spouse and son are helping with decision making at this time.   Current Services: Current with [facility] out of [place].  Spoke with the agency and made them aware she is going to an ECF.  Family is aware SW was calling them.    New Services Requested: Met with son Leroy and spouse David.  They are requesting ECF at discharge.    Family/ Social/ Home environment: From home with spouse.  She was getting around with a rollator, but would forget to use it and fall if spouse was in another room.  Spouse is getting to the point where he feels he can't take care of her.  Son is very supportive also.   Payment Source: Medicare and [employer].    Transportation at Discharge: Unsure, may need ambulance.  Depends how she is moving and her cognition. Spouse was transporting prior to admission.   Post-acute (PAC) provider list was provided to patient. Patient was informed of their freedom to choose PAC provider. Discussed and offered to show the patient the relevant PAC Providers quality and resource use measures on Medicare Compare web site via computer based on patient's goals of care and treatment preferences. Questions regarding selection process were answered.      Teach Back Method used with Angi's spouse David and son Leroy regarding care plan and discharge planning.   Patient's family verbalized understanding of the plan of care and contribute to goal setting.         Patient preferences and discharge plan: Family would like Angi to go to an ECF at discharge.  They would like rehab at a facility, but are considering long term placement.  They have long term care insurance.  Their first two choices are [facility] and [facility].  Called and made referral to Cheryl at [facility].  Faxed requested information.      Electronically signed by MARGIE Shi on

## 2024-04-24 NOTE — PROGRESS NOTES
occlusions. **This report has been created using voice recognition software. It may contain minor errors which are inherent in voice recognition technology.** Final report electronically signed by Dr. Melanie Godinez on 4/22/2024 10:38 AM    CT HEAD WO CONTRAST    Result Date: 4/22/2024  PROCEDURE: CT HEAD WO CONTRAST CLINICAL INFORMATION: unresponsive episode, facial droop per ems, pupil unequal. COMPARISON: No prior study. TECHNIQUE: Noncontrast 5 mm axial images were obtained through the brain. Sagittal and coronal reconstructions were obtained. . All CT scans at this facility use dose modulation, iterative reconstruction, and/or weight-based dosing when appropriate to reduce radiation dose to as low as reasonably achievable. FINDINGS: There is no hemorrhage. There are no intra-or extra-axial collections.  There is no hydrocephalus, midline shift or mass effect.  There is a moderate amount of abnormal low attenuation in the white matter the brain suggesting chronic small vessel ischemic changes. There is no definite blurring of the gray-white matter differentiation. There are vascular calcifications. The paranasal sinuses and mastoid air cells are normally aerated.  There is no suspicious calvarial abnormality.       1. Moderate severity chronic small vessel ischemic changes. 2. No acute findings. These findings were telephoned to robin Wong supervisor at 10:01 AM on 4/22/2024 . **This report has been created using voice recognition software. It may contain minor errors which are inherent in voice recognition technology.** Final report electronically signed by Dr. Melanie Godinez on 4/22/2024 10:03 AM      EKG:   No new.    Micro:   Patient Infection Status       None to display                     Path:   N/A    Signed:  Yahir Biswas MD  Internal Medicine, PGY-3, Chief Resident  4/24/2024  6:54 AM    Staff: Mikey Mayes MD

## 2024-04-24 NOTE — CONSULTS
Neurology Stroke Alert Note    Date:4/22/2024       Room:[address] [place] [address]  Patient Name:Angi Mejia     YOB: 1950     Age:73 y.o.  Chief Complaint   Patient presents with    Cerebrovascular Accident        Requesting Physician: Jose Holguin MD     Reason for Consult:  Evaluate for stroke alert    Subjective       HPI: Angi Mejia who is a 73 y.o. female with a history of skin cancer, hypertension, and Parkinson's who presented to [facility] ED as a stroke alert for sudden onset right sided facial droop and left sided weakness per report. Patient states that she woke up this morning and was fine and went to take a bath and suddenly felt unwell. She states she got nauseous and began to throw up. Per chart review patient had an episode of unresponsiveness to which EMS was called. Stroke alert was called at 9:29 AM with a 25 minute ETA. Initial /117. Initial glucose 154. Initial NIH a 3 for stating the wrong month, mild right sided facial droop, and left sided sensory deficit. Stat CT head obtained and negative for acute intracranial findings. Given low NIH, patient deemed not a candidate for TNK. Stat CTA head and neck negative for LVO. Given this, patient deemed not a candidate for neurointervention. Recommend stat MRI brain for further evaluation. Patient was given Aspirin 325mg and Plavix 75mg.     Last known well:  7:00 AM.  Time of stroke alert:  9:29 AM with a 25 minute ETA.  Time of neurology arrival:  9:35 AM.  Vascular risk factors:  hypertension.  Initial glucose: 154 .  Old deficits from prior stroke:  N/A.  INR in ED if anticoagulated:  not applicable.  Initial blood pressure:  166/117.  Initial NIHSS: 3.  Pre-morbid Modified Vero Scale:  2.   Time of initial imaging read:  10:01 AM.  Thrombolytic administered:  not indicated/contraindicated.  Thrombectomy performed:  not indicated.  Puncture time:  not applicable.       Review of Systems   Review of Systems   Unable to perform ROS: 
resolved hospital problems. *       Continue current plan of care for chronic diseases per primary and specialist teams  Change code status to Limited x 4  Patient will continue to follow with the palliative care clinic on discharge  Will refer to Palliative Care Clinic on discharge  Palliative care will follow peripherally and as needed during this hospitalization please feel free to [employer] or call the palliative care team if there are any further issues    CURRENT CODE STATUS:  Full Code Limited Code details: Intubation/Re-intubation No; Defibrillation/Cardioversion No; Chest Compressions No; Resuscitative Medications No; Other No Comment         Parts of this note may have been dictated by use of voice recognition software and electronically transcribed. The note may contain errors not detected in proofreading    Electronically signed by Collin Christiansen MD on 4/24/2024 at 4:39 PM           Palliative Care Office: [phone]

## 2024-04-24 NOTE — PLAN OF CARE
Problem: Discharge Planning  Goal: Discharge to home or other facility with appropriate resources  Outcome: Progressing  Flowsheets (Taken 4/22/2024 2138 by Blanka Torres, RN)  Discharge to home or other facility with appropriate resources:   Identify barriers to discharge with patient and caregiver   Arrange for needed discharge resources and transportation as appropriate     Problem: Skin/Tissue Integrity  Goal: Absence of new skin breakdown  Description: 1.  Monitor for areas of redness and/or skin breakdown  2.  Assess vascular access sites hourly  3.  Every 4-6 hours minimum:  Change oxygen saturation probe site  4.  Every 4-6 hours:  If on nasal continuous positive airway pressure, respiratory therapy assess nares and determine need for appliance change or resting period.  Outcome: Progressing     Problem: ABCDS Injury Assessment  Goal: Absence of physical injury  Outcome: Progressing  Flowsheets (Taken 4/22/2024 2138 by Blanka Torres, RN)  Absence of Physical Injury: Implement safety measures based on patient assessment     Problem: Safety - Adult  Goal: Free from fall injury  Outcome: Progressing  Flowsheets (Taken 4/22/2024 2138 by Blanka Torres RN)  Free From Fall Injury: Instruct family/caregiver on patient safety     Problem: Pain  Goal: Verbalizes/displays adequate comfort level or baseline comfort level  Outcome: Progressing  Flowsheets (Taken 4/22/2024 2138 by Blanka Torres RN)  Verbalizes/displays adequate comfort level or baseline comfort level:   Encourage patient to monitor pain and request assistance   Assess pain using appropriate pain scale   Administer analgesics based on type and severity of pain and evaluate response   Implement non-pharmacological measures as appropriate and evaluate response     Problem: Infection - Adult  Goal: Absence of infection at discharge  Outcome: Progressing  Flowsheets (Taken 4/24/2024 1102)  Absence of infection at discharge:

## 2024-04-24 NOTE — PROGRESS NOTES
[facility]  INPATIENT PHYSICAL THERAPY  DAILY NOTE  [facility] NEUROSCIENCES 4A - 4A-15/015-A    Time In: 1128  Time Out: 1157  Timed Code Treatment Minutes: 29 Minutes  Minutes: 29          Date: 2024  Patient Name: Angi Mejia,  Gender:  female        MRN: [id number]  : 1950  (73 y.o.)     Referring Practitioner: Gris Conn MD  Diagnosis: Stroke-like symptoms  Additional Pertinent Hx: Per H& P: 74 yo F with Parkinson's disease and HTN presented to [facility] ED for code stroke via EMS. NIH 3 on arrival.      Ed: mildly hypertensive, otherwise hemodynamically stable, saturating well on Ra. Labs generally unremarkable. CT head/ CTA head and neck/ MRI all done without acute findings. EKG without acute ischemic findings and Trop negative. Neurology evaluated patient. Admitted for further work up and management.      Per patient and family (son and  present bedside), patient was noted to be unresponsive suddenly/ staring without responding to anything sitting up when she was at breakfast table. Her granddaughter was with her who noticed her drooling from the side of her mouth. Called EMS. When they came, she was starting to talk but was still slow. Unresponsiveness lasted ~10 mins. No other associated symptoms at the time including shaking, urinary incontinence. Per family, she has been eating less and has been noted to be more tired. Has intermittent shaking usually RUE related to Parkinson's disease. Has not seen Neuro locally yet. Complaint to her meds.   Patient reports no dysuria/ urinary urgency/ frequency, subjective fever, chills. Was nauseated while en route to the ED and reports vomiting once. Not prior to it. Does endorse having lower abdominal pain that she has been feeling since yesterday. Nonsmoker, occasional alcohol intake. No other prior hx seizure. Non diabetic. BP usually controlled ~ 120s/70s.     Prior Level of Function:  Lives With: Spouse  Type of Home:  and increased time.  Short Term Goal 3: Pt will sit EOB, statically and dynamically with or without BUE support > 5min  Short Term Goal 4: Pt will perform stand pivot transfers without AD and with Mela and increased time.  Long Term Goals  Time Frame for Long Term Goals : NA due to short ELOS    Following session, patient left supine in bed with bed alarm activated. Call light and tray table within reach. Nursing staff aware of conclusion of intervention and pt performance.

## 2024-04-24 NOTE — PROGRESS NOTES
[facility]  STRZ NEUROSCIENCES 4A  Occupational Therapy  Daily Note  Time:   Time In: 0812  Time Out: 0850  Timed Code Treatment Minutes: 38 Minutes  Minutes: 38          Date: 2024  Patient Name: Angi Mejia,   Gender: female      Room: [facility]15/015-A  MRN: [id number]  : 1950  (73 y.o.)  Referring Practitioner: Gris Conn MD  Diagnosis: stroke-like symptoms  Additional Pertinent Hx: Per H& P: 72 yo F with Parkinson's disease and HTN presented to [facility] ED for code stroke via EMS. NIH 3 on arrival.      Ed: mildly hypertensive, otherwise hemodynamically stable, saturating well on Ra. Labs generally unremarkable. CT head/ CTA head and neck/ MRI all done without acute findings. EKG without acute ischemic findings and Trop negative. Neurology evaluated patient. Admitted for further work up and management.      Per patient and family (son and  present bedside), patient was noted to be unresponsive suddenly/ staring without responding to anything sitting up when she was at breakfast table. Her granddaughter was with her who noticed her drooling from the side of her mouth. Called EMS. When they came, she was starting to talk but was still slow. Unresponsiveness lasted ~10 mins. No other associated symptoms at the time including shaking, urinary incontinence. Per family, she has been eating less and has been noted to be more tired. Has intermittent shaking usually RUE related to Parkinson's disease. Has not seen Neuro locally yet. Complaint to her meds.   Patient reports no dysuria/ urinary urgency/ frequency, subjective fever, chills. Was nauseated while en route to the ED and reports vomiting once. Not prior to it. Does endorse having lower abdominal pain that she has been feeling since yesterday. Nonsmoker, occasional alcohol intake. No other prior hx seizure. Non diabetic. BP usually controlled ~ 120s/70s.    Restrictions/Precautions:  Restrictions/Precautions: Fall Risk,

## 2024-04-24 NOTE — PROGRESS NOTES
Comprehensive Nutrition Assessment    Type and Reason for Visit:  Initial, Positive Nutrition Screen (Weight Loss)    Nutrition Recommendations/Plan:   Recommend a Multivitamin daily.  Started Ensure Plus TID.  Continue current diet.     Malnutrition Assessment:  Malnutrition Status:  Severe malnutrition (04/24/24 1254)    Context:  Chronic Illness     Findings of the 6 clinical characteristics of malnutrition:  Energy Intake:  75% or less estimated energy requirements for 1 month or longer (per family report)  Weight Loss:   (-14.6% unplanned weight loss in 5 months per EMR)     Body Fat Loss:  Mild body fat loss Orbital   Muscle Mass Loss:  Mild muscle mass loss Temples (temporalis), Clavicles (pectoralis & deltoids)  Fluid Accumulation:  No significant fluid accumulation Extremities   Strength:  Not Performed    Nutrition Assessment: Pt. severely malnourished AEB criteria as listed above. At risk for further nutrition compromise r/t admit d/t acute metabolic encephalopathy 2/2 UTI, HTN, parkinson's disease/hypokinesia/tremor and underlying medical condition (Hx: Parkinson's Disease, HTN, Skin Cancer).        Nutrition Related Findings:    Pt. Report/Treatments/Miscellaneous: Pt seen with family present; pt confused during visit and unable to answer some questions. Pt's family reports pt with decreased intake of meals over the past month worsening over the past week when patient was unable to feed herself. Family reports pt has lost ~25 lbs in the past 6 months. Pt denies any N/V today. Pt amenable to consume Ensure Plus TID.  GI Status: Last BM x1 on 4/21/24.  Pertinent Labs: noted.  Pertinent Meds: Lipitor  Wound Type: None       Current Nutrition Intake & Therapies:    Average Meal Intake: 51-75% (per EMR)  Average Supplements Intake:  (initiated today)  ADULT DIET; Regular  ADULT ORAL NUTRITION SUPPLEMENT; Breakfast, Lunch, Dinner; Standard High Calorie/High Protein Oral Supplement    Anthropometric  Measures:  Height: 160 cm (5' 3\")  Ideal Body Weight (IBW): 115 lbs (52 kg)    Admission Body Weight: 66.1 kg (145 lb 11.6 oz) (4/22; no edema noted)  Current Body Weight: 63.9 kg (140 lb 14 oz) (4/24; no edema noted)  Current BMI (kg/m2): 25  Usual Body Weight:  (Per EMR: 164 lb 12.8 oz on 11/17/23)  BMI Categories: Overweight (BMI 25.0-29.9)    Estimated Daily Nutrient Needs:  Energy Requirements Based On: Kcal/kg  Weight Used for Energy Requirements: Current  Energy (kcal/day): 2599-4367 kcal/day (25-30 kcal/kg)  Weight Used for Protein Requirements: Current  Protein (g/day): 76+ g/day (1.2+ g/kg)    Nutrition Diagnosis:   Severe malnutrition, In context of chronic illness related to inadequate protein-energy intake as evidenced by Criteria as identified in malnutrition assessment    Nutrition Interventions:   Food and/or Nutrient Delivery: Continue Current Diet, Start Oral Nutrition Supplement, Vitamin Supplement  Nutrition Education/Counseling: Education initiated (Encouraged po intake of meals and ONS at best effort)  Coordination of Nutrition Care: Continue to monitor while inpatient    Goals:  Goals: PO intake 75% or greater, by next RD assessment    Nutrition Monitoring and Evaluation:   Behavioral-Environmental Outcomes: None Identified  Food/Nutrient Intake Outcomes: Food and Nutrient Intake, Supplement Intake, Vitamin/Mineral Intake, Diet Advancement/Tolerance  Physical Signs/Symptoms Outcomes: Biochemical Data, GI Status, Weight, Skin, Nutrition Focused Physical Findings, Fluid Status or Edema    Discharge Planning:    Too soon to determine     Alyssa Reilly MS, RD, LD  Contact: [phone]

## 2024-04-24 NOTE — DISCHARGE SUMMARY
Hospital Medicine Discharge Summary      Patient Identification:   Angi Mejia   : 1950  MRN: [id number]   Account: [id number]      Patient's PCP: Lazaro Sanches MD    Admit Date: 2024     Discharge Date:   24    Admitting Physician: No admitting provider for patient encounter.     Discharge Physician: Yahir Biswas MD     Hospital Course:   Angi Mejia is a 73 y.o. female admitted to [facility] on 2024 with a PMH of Parkinson's Disease / HTN who presented on 2024 via EMS w/ NIHSS of 3 d/t \"unresponsiveness\". CODE STROKE called. NCCT Head / CTA H+N / MRI brain w/o contrast all negative for acute findings. Findings felt to be d/t UTI. UCx resulting in pan-sensitive E. Coli. Will discharge on PO antibiotics    The patient was stable for discharge - all consultants were contacted and in agreement with plan for discharge.  Appropriate follow up appointment was arranged prior to discharge.     Please see below or view chart for more details from hospital course.     Discharge Diagnoses:    #Acute Metabolic Encephalopathy 2/2 UTI: Resolving  Presented as CODE STROKE w/ NIHSS of 3 but imaging has all been negative.   Plan:   -Antimicrobials    +PO Cefdinir (24-24)    +IV CTX (24-24)    +Adjust as appropriate  -UCx obtained 24, result: enteric GNB     #Alkaline Phosphatemia: Resolved  Isolated elevation. US Liver / GB only shows GB sludge but no s/s of cholecystitis. Pt also asymptomatic in that regard  Plan: Continue to monitor for s/s of symptomatology as appropriate     #HTN: Stable. Continue ACEi.   #Parkinson's Disease / Hypokinesia / Tremor: Stable. Follows w/ KAVITHA Yu, Neurology OP. Continue sinemet / amantadine / donepezil. Palliative Care Consulted; appreciate recs     The patient was seen and examined on day of discharge and this discharge summary is in conjunction with any daily progress note from

## 2024-04-25 VITALS
WEIGHT: 143.74 LBS | TEMPERATURE: 98.4 F | BODY MASS INDEX: 25.47 KG/M2 | SYSTOLIC BLOOD PRESSURE: 148 MMHG | HEIGHT: 63 IN | RESPIRATION RATE: 18 BRPM | OXYGEN SATURATION: 96 % | DIASTOLIC BLOOD PRESSURE: 91 MMHG | HEART RATE: 85 BPM

## 2024-04-25 PROCEDURE — 97530 THERAPEUTIC ACTIVITIES: CPT

## 2024-04-25 PROCEDURE — 6370000000 HC RX 637 (ALT 250 FOR IP)

## 2024-04-25 PROCEDURE — 97112 NEUROMUSCULAR REEDUCATION: CPT

## 2024-04-25 PROCEDURE — 99239 HOSP IP/OBS DSCHRG MGMT >30: CPT | Performed by: STUDENT IN AN ORGANIZED HEALTH CARE EDUCATION/TRAINING PROGRAM

## 2024-04-25 PROCEDURE — 97535 SELF CARE MNGMENT TRAINING: CPT

## 2024-04-25 PROCEDURE — 92526 ORAL FUNCTION THERAPY: CPT

## 2024-04-25 PROCEDURE — 2580000003 HC RX 258: Performed by: STUDENT IN AN ORGANIZED HEALTH CARE EDUCATION/TRAINING PROGRAM

## 2024-04-25 PROCEDURE — 6370000000 HC RX 637 (ALT 250 FOR IP): Performed by: STUDENT IN AN ORGANIZED HEALTH CARE EDUCATION/TRAINING PROGRAM

## 2024-04-25 RX ADMIN — ASPIRIN 81 MG 81 MG: 81 TABLET ORAL at 08:42

## 2024-04-25 RX ADMIN — CARBIDOPA AND LEVODOPA 1.5 TABLET: 25; 100 TABLET ORAL at 13:12

## 2024-04-25 RX ADMIN — CEFDINIR 300 MG: 300 CAPSULE ORAL at 08:42

## 2024-04-25 RX ADMIN — CARBIDOPA AND LEVODOPA 1.5 TABLET: 25; 100 TABLET ORAL at 08:42

## 2024-04-25 RX ADMIN — AMANTADINE HYDROCHLORIDE 100 MG: 100 CAPSULE, GELATIN COATED ORAL at 08:43

## 2024-04-25 RX ADMIN — LISINOPRIL 40 MG: 40 TABLET ORAL at 08:42

## 2024-04-25 RX ADMIN — SODIUM CHLORIDE, PRESERVATIVE FREE 10 ML: 5 INJECTION INTRAVENOUS at 08:49

## 2024-04-25 ASSESSMENT — PAIN SCALES - GENERAL
PAINLEVEL_OUTOF10: 0
PAINLEVEL_OUTOF10: 0

## 2024-04-25 NOTE — PLAN OF CARE
Problem: Discharge Planning  Goal: Discharge to home or other facility with appropriate resources  4/25/2024 1012 by Annamarie Vaughn RN  Outcome: Progressing  Flowsheets (Taken 4/25/2024 1012)  Discharge to home or other facility with appropriate resources:   Identify barriers to discharge with patient and caregiver   Arrange for needed discharge resources and transportation as appropriate   Identify discharge learning needs (meds, wound care, etc)   Refer to discharge planning if patient needs post-hospital services based on physician order or complex needs related to functional status, cognitive ability or social support system     Problem: Skin/Tissue Integrity  Goal: Absence of new skin breakdown  4/25/2024 1012 by Annamarie Vaughn RN  Outcome: Progressing  Note: No signs of new skin breakdown with each assessment. Skin remains warm, dry, intact. Mucous membranes pink & moist. Patient is able to turn self.        Problem: ABCDS Injury Assessment  Goal: Absence of physical injury  4/25/2024 1012 by Annamarie Vaughn RN  Outcome: Progressing  Flowsheets (Taken 4/25/2024 1012)  Absence of Physical Injury: Implement safety measures based on patient assessment     Problem: Safety - Adult  Goal: Free from fall injury  4/25/2024 1012 by Annamarie Vaughn RN  Outcome: Progressing  Flowsheets (Taken 4/25/2024 1012)  Free From Fall Injury: Instruct family/caregiver on patient safety     Problem: Pain  Goal: Verbalizes/displays adequate comfort level or baseline comfort level  4/25/2024 1012 by Annamarie Vaughn RN  Outcome: Progressing  Flowsheets (Taken 4/25/2024 1012)  Verbalizes/displays adequate comfort level or baseline comfort level:   Encourage patient to monitor pain and request assistance   Assess pain using appropriate pain scale   Administer analgesics based on type and severity of pain and evaluate response   Implement non-pharmacological measures as appropriate and evaluate response   Consider cultural  and social influences on pain and pain management   Notify Licensed Independent Practitioner if interventions unsuccessful or patient reports new pain     Problem: Infection - Adult  Goal: Absence of infection at discharge  4/25/2024 1012 by Annamarie Vaughn RN  Outcome: Progressing  Flowsheets (Taken 4/25/2024 1012)  Absence of infection at discharge:   Assess and monitor for signs and symptoms of infection   Monitor lab/diagnostic results   Monitor all insertion sites i.e., indwelling lines, tubes and drains   [place] appropriate cooling/warming therapies per order   Administer medications as ordered   Instruct and encourage patient and family to use good hand hygiene technique   Identify and instruct in appropriate isolation precautions for identified infection/condition     Problem: Nutrition Deficit:  Goal: Optimize nutritional status  4/25/2024 1012 by Annamarie Vaughn RN  Outcome: Progressing  Flowsheets (Taken 4/25/2024 1012)  Nutrient intake appropriate for improving, restoring, or maintaining nutritional needs:   Monitor oral intake, labs, and treatment plans   Assess nutritional status and recommend course of action     Care plan reviewed with patient and family.  Patient and family verbalize understanding of the plan of care and contribute to goal setting.

## 2024-04-25 NOTE — DISCHARGE SUMMARY
Hospital Medicine Discharge Summary      Patient Identification:   Angi Mejia   : 1950  MRN: [id number]   Account: [id number]      Patient's PCP: Lazaro Sanches MD    Admit Date: 2024     Discharge Date:   24    Admitting Physician: No admitting provider for patient encounter.     Discharge Physician: Yahir Biswas MD     Hospital Course:   Angi Mejia is a 73 y.o. female admitted to [facility] on 2024 with a PMH of Parkinson's Disease / HTN who presented on 2024 via EMS w/ NIHSS of 3 d/t \"unresponsiveness\". CODE STROKE called. NCCT Head / CTA H+N / MRI brain w/o contrast all negative for acute findings. Findings felt to be d/t UTI. UCx resulting in pan-sensitive E. Coli. Will discharge on PO antibiotics    The patient was stable for discharge - all consultants were contacted and in agreement with plan for discharge.  Appropriate follow up appointment was arranged prior to discharge.     Please see below or view chart for more details from hospital course.     Discharge Diagnoses:    #Acute Metabolic Encephalopathy 2/2 UTI: Resolving  Presented as CODE STROKE w/ NIHSS of 3 but imaging has all been negative.   Plan:   -Antimicrobials    +PO Cefdinir (24-24)    +IV CTX (24-24)    +Adjust as appropriate  -UCx obtained 24, result: enteric GNB     #Alkaline Phosphatemia: Resolved  Isolated elevation. US Liver / GB only shows GB sludge but no s/s of cholecystitis. Pt also asymptomatic in that regard  Plan: Continue to monitor for s/s of symptomatology as appropriate     #HTN: Stable. Continue ACEi.   #Parkinson's Disease / Hypokinesia / Tremor: Stable. Follows w/ KAVITHA Yu, Neurology OP. Continue sinemet / amantadine / donepezil. Palliative Care Consulted; appreciate recs     The patient was seen and examined on day of discharge and this discharge summary is in conjunction with any daily progress note from  04/22/2024 10:03 AM       Renal:    Lab Results   Component Value Date/Time     04/22/2024 10:03 AM    K 4.0 04/22/2024 10:03 AM     04/22/2024 10:03 AM    CO2 26 04/22/2024 10:03 AM    BUN 14 04/22/2024 10:03 AM    CREATININE 0.6 04/22/2024 10:03 AM    CALCIUM 9.3 04/22/2024 10:03 AM         Significant Diagnostic Studies    Radiology:   US LIVER   Final Result   1. No focal liver lesion. No biliary ductal dilatation.   2. Gallbladder sludge is noted. No gallstone. No evidence of acute    cholecystitis.      This document has been electronically signed by: Brett Gomez M.D. on    04/22/2024 11:53 PM      XR CHEST PORTABLE   Final Result   1. Normal heart size. Evidence for old, healed granulomatous disease. Mild degenerative changes left shoulder.   2. No acute findings. No infiltrates or effusions are seen.            **This report has been created using voice recognition software.  It may contain minor errors which are inherent in voice recognition technology.**      Final report electronically signed by Dr. Lester Askew on 4/22/2024 4:16 PM      MRI BRAIN WO CONTRAST   Final Result       1. Mild atrophy and dilatation of the lateral ventricles.   2. Probable ischemic changes in the white matter. No evidence for an acute infarct.   3. Probable small neuroepithelial cysts in the medial temporal lobes bilaterally.   4. Inflammatory changes in the mastoid air cells bilaterally.               **This report has been created using voice recognition software. It may contain minor errors which are inherent in voice recognition technology.**      Final report electronically signed by DR GIACOMO PARKS on 4/22/2024 11:56 AM      CTA HEAD W WO CONTRAST (CODE STROKE)   Final Result      1. Normal-appearing carotid bulbs bilaterally.   2. No intracranial stenoses or occlusions.               **This report has been created using voice recognition software. It may contain minor errors which are inherent in voice

## 2024-04-25 NOTE — PROGRESS NOTES
Report called to Mendy jalloh [facility] [place]. All questions answered. Patient transported via [facility] with all belongings.

## 2024-04-25 NOTE — PROGRESS NOTES
[facility]  STRZ NEUROSCIENCES 4A  Occupational Therapy  Daily Note  Time:   Time In: 943  Time Out: 1047  Timed Code Treatment Minutes: 64 Minutes  Minutes: 64          Date: 2024  Patient Name: Angi Mejia,   Gender: female      Room: [facility]15/015-A  MRN: [id number]  : 1950  (73 y.o.)  Referring Practitioner: Gris Conn MD  Diagnosis: stroke-like symptoms  Additional Pertinent Hx: Per H& P: 72 yo F with Parkinson's disease and HTN presented to [facility] ED for code stroke via EMS. NIH 3 on arrival.      Ed: mildly hypertensive, otherwise hemodynamically stable, saturating well on Ra. Labs generally unremarkable. CT head/ CTA head and neck/ MRI all done without acute findings. EKG without acute ischemic findings and Trop negative. Neurology evaluated patient. Admitted for further work up and management.      Per patient and family (son and  present bedside), patient was noted to be unresponsive suddenly/ staring without responding to anything sitting up when she was at breakfast table. Her granddaughter was with her who noticed her drooling from the side of her mouth. Called EMS. When they came, she was starting to talk but was still slow. Unresponsiveness lasted ~10 mins. No other associated symptoms at the time including shaking, urinary incontinence. Per family, she has been eating less and has been noted to be more tired. Has intermittent shaking usually RUE related to Parkinson's disease. Has not seen Neuro locally yet. Complaint to her meds.   Patient reports no dysuria/ urinary urgency/ frequency, subjective fever, chills. Was nauseated while en route to the ED and reports vomiting once. Not prior to it. Does endorse having lower abdominal pain that she has been feeling since yesterday. Nonsmoker, occasional alcohol intake. No other prior hx seizure. Non diabetic. BP usually controlled ~ 120s/70s.    Restrictions/Precautions:  Restrictions/Precautions: Fall Risk,

## 2024-04-25 NOTE — PROGRESS NOTES
Medicine Progress Note    Patient:  Angi Mejia    Unit/Bed:4A-15/015-A  YOB: 1950  MRN: [id number]   Acct: [id number]   PCP: Lazaro Sanches MD  Date of Admission: 4/22/2024    Hospital Course     Briefly, pt Angi Mejia is a 73 y.o. female with a PMH of Parkinson's Disease / HTN who presented on 4/22/2024 via EMS w/ NIHSS of 3 d/t \"unresponsiveness\". CODE STROKE called. NCCT Head / CTA H+N / MRI brain w/o contrast all negative for acute findings. Findings felt to be d/t UTI.     Assessment / Plan     #Acute Metabolic Encephalopathy 2/2 UTI: Resolving  Presented as CODE STROKE w/ NIHSS of 3 but imaging has all been negative.   Plan:   -Antimicrobials    +PO Cefdinir (04/24/24-04/26/24)    +IV CTX (04/22/24-04/23/24)    +Adjust as appropriate  -UCx obtained 04/22/24, result: enteric GNB     #Alkaline Phosphatemia: Resolved  Isolated elevation. US Liver / GB only shows GB sludge but no s/s of cholecystitis. Pt also asymptomatic in that regard  Plan: Continue to monitor for s/s of symptomatology as appropriate     #HTN: Stable. Continue ACEi.   #Parkinson's Disease / Hypokinesia / Tremor: Stable. Follows w/ KAVITHA Yu, Neurology OP. Continue sinemet / amantadine / donepezil. Palliative Care Consulted; appreciate recs     Dispo: SNF    Fluids: Encourage PO fluid intake  Electrolyte: Replete as needed   Nutrition: ADULT DIET; Regular  ADULT ORAL NUTRITION SUPPLEMENT; Breakfast, Lunch, Dinner; Standard High Calorie/High Protein Oral Supplement  GI: none    Lines/Tubes:     Peripheral IV 04/22/24 Distal;Left;Posterior Forearm (Active)   Site Assessment Clean, dry & intact 04/24/24 0600   Line Status Normal saline locked;Capped 04/24/24 0600   Phlebitis Assessment No symptoms 04/22/24 2000   Infiltration Assessment 0 04/22/24 2000   Dressing Status Clean, dry & intact 04/24/24 0600   Dressing Type Transparent 04/24/24 0600       Peripheral IV 04/22/24 Right Antecubital  dysuria/ urinary urgency/ frequency, subjective fever, chills. Was nauseated while en route to the ED and reports vomiting once. Not prior to it. Does endorse having lower abdominal pain that she has been feeling since yesterday. Nonsmoker, occasional alcohol intake. No other prior hx seizure. Non diabetic. BP usually controlled ~ 120s/70s.\" Dr. Gris Conn MD    Objective     Vitals:     /81   Pulse 85   Temp 97.9 °F (36.6 °C) (Oral)   Resp 16   Ht 1.6 m (5' 3\")   Wt 65.2 kg (143 lb 11.8 oz)   SpO2 98%   BMI 25.46 kg/m²     Ventilator Settings:                           Intake and Output:       Intake/Output Summary (Last 24 hours) at 4/25/2024 0912  Last data filed at 4/25/2024 0836  Gross per 24 hour   Intake 612.07 ml   Output 600 ml   Net 12.07 ml         Outpatient Medications:     Scheduled Meds:   cefdinir  300 mg Oral 2 times per day    aspirin  81 mg Oral Daily    atorvastatin  80 mg Oral Nightly    sodium chloride flush  5-40 mL IntraVENous 2 times per day    enoxaparin  40 mg SubCUTAneous Q24H    amantadine  100 mg Oral BID    carbidopa-levodopa  1.5 tablet Oral TID    donepezil  5 mg Oral Nightly    lisinopril  40 mg Oral Daily     Continuous Infusions:   sodium chloride Stopped (04/23/24 1930)     PRN Meds:sodium chloride flush, sodium chloride, potassium chloride **OR** potassium alternative oral replacement **OR** potassium chloride, magnesium sulfate, ondansetron **OR** ondansetron, polyethylene glycol, acetaminophen **OR** acetaminophen    Physical Exam:     Physical Exam  Vitals and nursing note reviewed.   Constitutional:       General: She is awake.      Appearance: Normal appearance. She is not ill-appearing or diaphoretic.   HENT:      Head: Normocephalic and atraumatic.      Right Ear: External ear normal.      Left Ear: External ear normal.      Nose: Nose normal.      Mouth/Throat:      Mouth: Mucous membranes are moist.      Pharynx: Oropharynx is clear. No oropharyngeal

## 2024-04-25 NOTE — PROGRESS NOTES
[facility]  INPATIENT SPEECH THERAPY  STRZ NEUROSCIENCES 4A  DISCHARGE NOTE    TIME   SLP Individual Minutes  Time In: 903  Time Out: 911  Minutes: 8  Timed Code Treatment Minutes: 0 Minutes       Date: 2024  Patient Name: Angi Mejia      CSN: [id number]   : 1950  (73 y.o.)  Gender: female   Referring Physician:  Gris Cnon MD   Diagnosis: Stroke-like symptoms  Precautions: fall risk   Current Diet: Regular diet and thin liquids - direct 1:1 assistance  Respiratory Status: Room Air  Swallowing Strategies: Full Upright Position, Small Bite/Sip, Medications Whole with Thin, Direct 1:1 Supervision, Alternate Solids and Liquids, and Monitor for Fatigue     Date of Last MBS/FEES: Not Applicable    Pain:  No pain reported.    Subjective:  EARL Matthews approved session. Patient sitting in recliner upon ST arrival. Agreeable to skilled ST services. No family present. Pleasant and cooperative throughout.     Short-Term Goals:  SHORT TERM GOAL #1:  Goal 1: Patient will consume a regular texture diet and thin liquids while implementing recommended swallow strategies with stable pulmonary status and adequate endurance to assist with meeting nutrition/hydration needs across 2-3 sessions. GOAL MET; NO NEW GOAL  INTERVENTIONS: Patient consumed PO trials of anita cracker dipped in peanut butter this date. Good bolus control/manipulation and timely rotary mastication with effective textural breakdown. Min residue on dentition, however, patient aware of residue and independently utilized lingual sweep and liquid wash to successfully clear residue. No overt signs/symptoms of aspiration across PO trials, however, certainly cannot rule out airway invasion events at bedside alone.     Recommend patient continue a regular diet and thin liquids with direct 1:1 assistance for feeding. No further skilled ST services warranted. Should further concerns arise, please re-consult.    Long-Term Goals:  No

## 2024-04-25 NOTE — PLAN OF CARE
Problem: Discharge Planning  Goal: Discharge to home or other facility with appropriate resources  4/24/2024 2337 by Yuliana Evans RN  Outcome: Progressing  Flowsheets (Taken 4/24/2024 2337)  Discharge to home or other facility with appropriate resources:   Identify barriers to discharge with patient and caregiver   Arrange for needed discharge resources and transportation as appropriate   Identify discharge learning needs (meds, wound care, etc)   Refer to discharge planning if patient needs post-hospital services based on physician order or complex needs related to functional status, cognitive ability or social support system     Problem: Skin/Tissue Integrity  Goal: Absence of new skin breakdown  Description: 1.  Monitor for areas of redness and/or skin breakdown  2.  Assess vascular access sites hourly  3.  Every 4-6 hours minimum:  Change oxygen saturation probe site  4.  Every 4-6 hours:  If on nasal continuous positive airway pressure, respiratory therapy assess nares and determine need for appliance change or resting period.  4/24/2024 2337 by Yuliana Evans RN  Outcome: Progressing     Problem: ABCDS Injury Assessment  Goal: Absence of physical injury  4/24/2024 2337 by Yuliana Evans RN  Outcome: Progressing  Flowsheets (Taken 4/24/2024 2337)  Absence of Physical Injury: Implement safety measures based on patient assessment     Problem: Safety - Adult  Goal: Free from fall injury  4/24/2024 2337 by Yuliana Evans RN  Outcome: Progressing  Flowsheets (Taken 4/24/2024 2337)  Free From Fall Injury:   Instruct family/caregiver on patient safety   Based on caregiver fall risk screen, instruct family/caregiver to ask for assistance with transferring infant if caregiver noted to have fall risk factors     Problem: Pain  Goal: Verbalizes/displays adequate comfort level or baseline comfort level  4/24/2024 2337 by Yuliana Evans, RN  Outcome: Progressing  Flowsheets (Taken 4/24/2024  2337)  Verbalizes/displays adequate comfort level or baseline comfort level:   Encourage patient to monitor pain and request assistance   Assess pain using appropriate pain scale   Administer analgesics based on type and severity of pain and evaluate response   Implement non-pharmacological measures as appropriate and evaluate response   Consider cultural and social influences on pain and pain management   Notify Licensed Independent Practitioner if interventions unsuccessful or patient reports new pain     Problem: Infection - Adult  Goal: Absence of infection at discharge  4/24/2024 2337 by Yuliana Evans RN  Outcome: Progressing  Flowsheets (Taken 4/24/2024 2337)  Absence of infection at discharge:   Assess and monitor for signs and symptoms of infection   Monitor lab/diagnostic results   Monitor all insertion sites i.e., indwelling lines, tubes and drains   Administer medications as ordered   Instruct and encourage patient and family to use good hand hygiene technique     Problem: Nutrition Deficit:  Goal: Optimize nutritional status  4/24/2024 2337 by Yuliana Evans, RN  Outcome: Progressing  Flowsheets (Taken 4/24/2024 2337)  Nutrient intake appropriate for improving, restoring, or maintaining nutritional needs:   Assess nutritional status and recommend course of action   Monitor oral intake, labs, and treatment plans   Recommend appropriate diets, oral nutritional supplements, and vitamin/mineral supplements   Care plan reviewed with patient and family.  Patient and family  verbalize understanding of the plan of care and contribute to goal setting.

## 2024-04-25 NOTE — PROGRESS NOTES
Physician Progress Note      PATIENT:               IMAN STEINER  [facility] #:                  [id number]  :                       1950  ADMIT DATE:       2024 9:54 AM  DISCH DATE:  RESPONDING  PROVIDER #:        URVASHI SCHULTZ          QUERY TEXT:    Patient admitted with UTI. Noted to have dietician assessment with   malnutrition diagnosis. If possible, please document in progress notes and   discharge summary if you are evaluating and /or treating any of the following:      The medical record reflects the following:  Risk Factors: malnutrition    Clinical Indicators:  Malnutrition Assessment:  Malnutrition Status:  Severe malnutrition (24 1254)  Context:  Chronic Illness  Findings of the 6 clinical characteristics of malnutrition:  Energy Intake:  75% or less estimated energy requirements for 1 month or   longer (per family report)  Weight Loss:   (-14.6% unplanned weight loss in 5 months per EMR)  Body Fat Loss:  Mild body fat loss Orbital  Muscle Mass Loss:  Mild muscle mass loss Temples (temporalis), Clavicles   (pectoralis & deltoids)  Fluid Accumulation:  No significant fluid accumulation Extremities   Strength:  Not Performed    Anthropometric Measures:  Height: 160 cm (5' 3\")  Ideal Body Weight (IBW): 115 lbs (52 kg)  Admission Body Weight: 66.1 kg (145 lb 11.6 oz) (; no edema noted)  Current Body Weight: 63.9 kg (140 lb 14 oz) (; no edema noted)  Current BMI (kg/m2): 25  Usual Body Weight:  (Per EMR: 164 lb 12.8 oz on 23)  BMI Categories: Overweight (BMI 25.0-29.9)    Treatment:  Nutrition Recommendations/Plan:  1. Recommend a Multivitamin daily.  2. Started Ensure Plus TID.  3. Continue current diet.      Thank you,  Charis Perez RN CDI  CRCR  Clinical Documentation  [phone] or via [employer]  [email or web address]      ASPEN Criteria:    https://aspenjournals.onlinelibrary.smith.com/doi/full/10.1177/342184891449896  5  Options provided:  -- Protein calorie

## 2024-04-25 NOTE — PROGRESS NOTES
[facility]  INPATIENT PHYSICAL THERAPY  DAILY NOTE  [facility] NEUROSCIENCES 4A - 4A-15/015-A    Time In: 0740  Time Out: 0824  Timed Code Treatment Minutes: 44 Minutes  Minutes: 44          Date: 2024  Patient Name: Angi Mejia,  Gender:  female        MRN: [id number]  : 1950  (73 y.o.)     Referring Practitioner: Gris Conn MD  Diagnosis: Stroke-like symptoms  Additional Pertinent Hx: Per H& P: 74 yo F with Parkinson's disease and HTN presented to [facility] ED for code stroke via EMS. NIH 3 on arrival.      Ed: mildly hypertensive, otherwise hemodynamically stable, saturating well on Ra. Labs generally unremarkable. CT head/ CTA head and neck/ MRI all done without acute findings. EKG without acute ischemic findings and Trop negative. Neurology evaluated patient. Admitted for further work up and management.      Per patient and family (son and  present bedside), patient was noted to be unresponsive suddenly/ staring without responding to anything sitting up when she was at breakfast table. Her granddaughter was with her who noticed her drooling from the side of her mouth. Called EMS. When they came, she was starting to talk but was still slow. Unresponsiveness lasted ~10 mins. No other associated symptoms at the time including shaking, urinary incontinence. Per family, she has been eating less and has been noted to be more tired. Has intermittent shaking usually RUE related to Parkinson's disease. Has not seen Neuro locally yet. Complaint to her meds.   Patient reports no dysuria/ urinary urgency/ frequency, subjective fever, chills. Was nauseated while en route to the ED and reports vomiting once. Not prior to it. Does endorse having lower abdominal pain that she has been feeling since yesterday. Nonsmoker, occasional alcohol intake. No other prior hx seizure. Non diabetic. BP usually controlled ~ 120s/70s.     Prior Level of Function:  Lives With: Spouse  Type of Home:

## 2024-04-25 NOTE — CARE COORDINATION
4/25/24, 8:14 AM EDT    DISCHARGE PLANNING EVALUATION    Left another message for Lydia at [facility].  Trying to make a referral to their facility.     Update 8:57 am: Spoke with Tiffanie at [facility].  She will have Lydia call so SW can make a referral.     Update 10:30 am: Spoke with Lydia at [facility] and they do not have a bed at this time.  Called and made referral to Cheryl at [facility].       4/25/24, 12:46 PM EDT    Patient goals/plan/ treatment preferences discussed by  and .  Patient goals/plan/ treatment preferences reviewed with patient/ family.  Patient/ family verbalize understanding of discharge plan and are in agreement with goal/plan/treatment preferences.  Understanding was demonstrated using the teach back method.  AVS provided by RN at time of discharge, which includes all necessary medical information pertaining to the patients current course of illness, treatment, post-discharge goals of care, and treatment preferences.     Services At/After Discharge: Skilled Nursing Facility (SNF), Aide services, In ambulance, Nursing service, OT, and PT       Angi has been accepted to [facility].  She will be skilled at the facility under her Medicare benefit.  She will be transported by ambulance.  Discharge instructions and transport forms are attached to blue discharge packet.  Spoke with son Leroy and he is agreeable to plan.  Called Edgar at the facility and they will be ready to accept.

## 2024-04-25 NOTE — DISCHARGE INSTR - COC
Continuity of Care Form    Patient Name: Angi Steiner   :  1950  MRN:  [id number]    Admit date:  2024  Discharge date:  24    Code Status Order: Limited   Advance Directives:     Admitting Physician:  No admitting provider for patient encounter.  PCP: Lazaro Sanches MD    Discharging Nurse: Cassie Vaughn RN  Discharging Hospital Unit/Room#: 4A-15/015-A  Discharging Unit Phone Number: [phone]    Emergency Contact:   Extended Emergency Contact Information  Primary Emergency Contact: GARTH STEINER  Home Phone: [phone]  Mobile Phone: [phone]  Relation: Spouse  Secondary Emergency Contact: KELSI STEINER  Home Phone: [phone]  Relation: Child    Past Surgical History:  Past Surgical History:   Procedure Laterality Date    MOHS SURGERY N/A 2023    MOHS Defect BCC Bridge of Nose with FTSG from left preauricular performed by Collin Cheatham MD at [facility] [place] [facility]    SKIN CANCER EXCISION         Immunization History:     There is no immunization history on file for this patient.    Active Problems:  Patient Active Problem List   Diagnosis Code    Stroke-like symptoms R29.90    Toxic metabolic encephalopathy G92.8    Acute cystitis without hematuria N30.00    Severe malnutrition (HCC) E43    Parkinson's disease (HCC) G20.A1    Essential hypertension I10    Spondylolisthesis of lumbar region M43.16    Palliative care patient Z51.5       Isolation/Infection:   Isolation            No Isolation          Patient Infection Status       None to display                     Nurse Assessment:  Last Vital Signs: /81   Pulse 85   Temp 97.9 °F (36.6 °C) (Oral)   Resp 16   Ht 1.6 m (5' 3\")   Wt 65.2 kg (143 lb 11.8 oz)   SpO2 98%   BMI 25.46 kg/m²     Last documented pain score (0-10 scale): Pain Level: 0  Last Weight:   Wt Readings from Last 1 Encounters:   24 65.2 kg (143 lb 11.8 oz)     Mental Status:  oriented and alert    IV Access:  - None    Nursing

## 2024-04-25 NOTE — PROGRESS NOTES
[facility]   PROGRESS NOTE      Patient: Angi Mejia  Room #: 4A-15/015-A            YOB: 1950  Age: 73 y.o.  Gender: female            Admit Date & Time: 4/22/2024  9:54 AM    Assessment:    The patient was attempted to be visited and is currently in active care. The care is such to inhibit a spiritual care visit at this time.     Interventions:  A the patient was unable to be visited at this time. A prayer was provided outside the patient's room.     Outcomes:  The patient continue to be followed by spiritual care.     Plan:  1.Spiritual care will continue to follow the patient according to [facility] spiritual care SOP.       Electronically signed by Chaplain Katherine, on 4/25/2024 at 12:24 PM.  Spiritual Care Department  [facility]  [phone]     04/25/24 1221   Encounter Summary   Encounter Overview/Reason Attempted Encounter   Service Provided For Patient not available   Referral/Consult From [facility]   Support System Spouse;Children   Last Encounter  04/25/24   Complexity of Encounter Low   Begin Time 1000   End Time  1005   Total Time Calculated 5 min   Spiritual/Emotional needs   Type Spiritual Support   Assessment/Intervention/Outcome   Assessment Other (Comment)  (in care)   Intervention Prayer (assurance of)/Muir

## 2024-04-30 NOTE — PROGRESS NOTES
Physician Progress Note      PATIENT:               IMNA STEINER  [facility] #:                  [id number]  :                       1950  ADMIT DATE:       2024 9:54 AM  DISCH DATE:        2024 4:57 PM  RESPONDING  PROVIDER #:        URVASHI SCHULTZ          QUERY TEXT:    Patient admitted with unresponsive episode. Noted documentation of Acute   cystitis. In order to support the diagnosis of Acute cystitis, please include   additional clinical indicators in your documentation.  Or please document if   the diagnosis of Acute cystitis has been ruled out after further study.    The medical record reflects the following:  Risk Factors: Acute cystitis    Clinical Indicators:  H&P   UTI/ Acute Cystitis: UA with many bacteria + lower abdominal pain + suprapubic   tenderness. Started on Rocephin. Urine cultures pending, will await   sensitivities to de-escalate abx.      ua 24 12:40  Bacteria, UA: MANY    urine cx  enteric gram negative bacilli Abnormal  P  Urine Culture, Routine [place] count: >100,000 CFU/mL P      Treatment:  cefdinir (OMNICEF) capsule 300 mg.  cefTRIAXone (ROCEPHIN) 1,000 mg in sodium chloride 0.9 % 50 mL IVPB (mini-bag)  sodium chloride 0.9 % bolus 500 mL    Thank you,  Charis Perez RN CDI  CRCR  Clinical Documentation  [phone] or via [employer]  [email or web address]  Options provided:  -- Acute cystitis present as evidenced by, Please document evidence.  -- Acute cystitis was ruled out  -- Other - I will add my own diagnosis  -- Disagree - Not applicable / Not valid  -- Disagree - Clinically unable to determine / Unknown  -- Refer to Clinical Documentation Reviewer    PROVIDER RESPONSE TEXT:    Acute cystitis is present as evidenced by acute metabolic encephlopathy    Query created by: Charis Perez on 2024 2:55 PM      Electronically signed by:  URVASHI SCHULTZ 2024 3:07 PM

## (undated) DEVICE — BANDAGE,GAUZE,4.5"X4.1YD,STERILE,LF: Brand: MEDLINE

## (undated) DEVICE — PACK PROCEDURE SURG PLAS SC MIN SRHP LF

## (undated) DEVICE — GLOVE SURG SZ 8 L11.77IN FNGR THK9.8MIL STRW LTX POLYMER

## (undated) DEVICE — COTTON BALL ST

## (undated) DEVICE — GLOVE ORANGE PI 7   MSG9070

## (undated) DEVICE — SUTURE MCRYL SZ 4-0 L18IN ABSRB UD P-3 L13MM 3/8 CIR PRIM Y494G